# Patient Record
Sex: MALE | Race: WHITE | Employment: OTHER | URBAN - METROPOLITAN AREA
[De-identification: names, ages, dates, MRNs, and addresses within clinical notes are randomized per-mention and may not be internally consistent; named-entity substitution may affect disease eponyms.]

---

## 2019-02-07 ENCOUNTER — OFFICE VISIT (OUTPATIENT)
Dept: ORTHOPEDIC SURGERY | Age: 64
End: 2019-02-07
Payer: COMMERCIAL

## 2019-02-07 VITALS
HEART RATE: 60 BPM | DIASTOLIC BLOOD PRESSURE: 75 MMHG | WEIGHT: 139 LBS | HEIGHT: 70 IN | SYSTOLIC BLOOD PRESSURE: 113 MMHG | BODY MASS INDEX: 19.9 KG/M2

## 2019-02-07 DIAGNOSIS — M23.204 DEGENERATIVE TEAR OF LEFT MEDIAL MENISCUS: ICD-10-CM

## 2019-02-07 DIAGNOSIS — M25.562 LEFT KNEE PAIN, UNSPECIFIED CHRONICITY: Primary | ICD-10-CM

## 2019-02-07 PROCEDURE — 99203 OFFICE O/P NEW LOW 30 MIN: CPT | Performed by: ORTHOPAEDIC SURGERY

## 2019-02-07 RX ORDER — DORZOLAMIDE HCL 20 MG/ML
2 SOLUTION/ DROPS OPHTHALMIC 3 TIMES DAILY
COMMUNITY

## 2019-02-07 RX ORDER — PREDNISONE 1 MG/1
1 TABLET ORAL DAILY
COMMUNITY
End: 2020-07-21

## 2019-02-07 RX ORDER — LATANOPROST 50 UG/ML
1 SOLUTION/ DROPS OPHTHALMIC NIGHTLY
COMMUNITY

## 2019-03-14 ENCOUNTER — OFFICE VISIT (OUTPATIENT)
Dept: ORTHOPEDIC SURGERY | Age: 64
End: 2019-03-14
Payer: COMMERCIAL

## 2019-03-14 VITALS
RESPIRATION RATE: 12 BRPM | HEART RATE: 48 BPM | HEIGHT: 70 IN | DIASTOLIC BLOOD PRESSURE: 77 MMHG | BODY MASS INDEX: 19.9 KG/M2 | SYSTOLIC BLOOD PRESSURE: 125 MMHG | WEIGHT: 139 LBS

## 2019-03-14 DIAGNOSIS — M25.521 RIGHT ELBOW PAIN: Primary | ICD-10-CM

## 2019-03-14 DIAGNOSIS — M77.11 LATERAL EPICONDYLITIS OF RIGHT ELBOW: ICD-10-CM

## 2019-03-14 PROCEDURE — 99214 OFFICE O/P EST MOD 30 MIN: CPT | Performed by: ORTHOPAEDIC SURGERY

## 2019-03-14 PROCEDURE — L3908 WHO COCK-UP NONMOLDE PRE OTS: HCPCS | Performed by: ORTHOPAEDIC SURGERY

## 2019-03-14 ASSESSMENT — ENCOUNTER SYMPTOMS
RESPIRATORY NEGATIVE: 1
GASTROINTESTINAL NEGATIVE: 1

## 2019-03-15 ENCOUNTER — TELEPHONE (OUTPATIENT)
Dept: ORTHOPEDIC SURGERY | Age: 64
End: 2019-03-15

## 2019-03-28 ENCOUNTER — TELEPHONE (OUTPATIENT)
Dept: ORTHOPEDIC SURGERY | Age: 64
End: 2019-03-28

## 2019-11-27 ENCOUNTER — OFFICE VISIT (OUTPATIENT)
Dept: INTERNAL MEDICINE CLINIC | Age: 64
End: 2019-11-27
Payer: COMMERCIAL

## 2019-11-27 VITALS
HEIGHT: 70 IN | WEIGHT: 132 LBS | DIASTOLIC BLOOD PRESSURE: 66 MMHG | BODY MASS INDEX: 18.9 KG/M2 | SYSTOLIC BLOOD PRESSURE: 122 MMHG

## 2019-11-27 DIAGNOSIS — M35.3 POLYMYALGIA RHEUMATICA (HCC): ICD-10-CM

## 2019-11-27 DIAGNOSIS — H61.23 BILATERAL IMPACTED CERUMEN: ICD-10-CM

## 2019-11-27 DIAGNOSIS — H40.1190 PRIMARY OPEN ANGLE GLAUCOMA, UNSPECIFIED GLAUCOMA STAGE, UNSPECIFIED LATERALITY: ICD-10-CM

## 2019-11-27 DIAGNOSIS — Z00.00 PHYSICAL EXAM: Primary | ICD-10-CM

## 2019-11-27 PROCEDURE — 99396 PREV VISIT EST AGE 40-64: CPT | Performed by: INTERNAL MEDICINE

## 2019-11-27 ASSESSMENT — PATIENT HEALTH QUESTIONNAIRE - PHQ9
2. FEELING DOWN, DEPRESSED OR HOPELESS: 0
1. LITTLE INTEREST OR PLEASURE IN DOING THINGS: 0
SUM OF ALL RESPONSES TO PHQ9 QUESTIONS 1 & 2: 0
SUM OF ALL RESPONSES TO PHQ QUESTIONS 1-9: 0
SUM OF ALL RESPONSES TO PHQ QUESTIONS 1-9: 0

## 2019-12-05 DIAGNOSIS — Z00.00 PHYSICAL EXAM: ICD-10-CM

## 2019-12-05 DIAGNOSIS — M35.3 POLYMYALGIA RHEUMATICA (HCC): ICD-10-CM

## 2019-12-05 LAB
A/G RATIO: 2.8 (ref 1.1–2.2)
ALBUMIN SERPL-MCNC: 4.4 G/DL (ref 3.4–5)
ALP BLD-CCNC: 49 U/L (ref 40–129)
ALT SERPL-CCNC: 16 U/L (ref 10–40)
ANION GAP SERPL CALCULATED.3IONS-SCNC: 10 MMOL/L (ref 3–16)
AST SERPL-CCNC: 20 U/L (ref 15–37)
BASOPHILS ABSOLUTE: 0.1 K/UL (ref 0–0.2)
BASOPHILS RELATIVE PERCENT: 1.1 %
BILIRUB SERPL-MCNC: 0.9 MG/DL (ref 0–1)
BILIRUBIN URINE: NEGATIVE
BLOOD, URINE: NEGATIVE
BUN BLDV-MCNC: 18 MG/DL (ref 7–20)
CALCIUM SERPL-MCNC: 8.9 MG/DL (ref 8.3–10.6)
CHLORIDE BLD-SCNC: 103 MMOL/L (ref 99–110)
CHOLESTEROL, TOTAL: 137 MG/DL (ref 0–199)
CLARITY: CLEAR
CO2: 25 MMOL/L (ref 21–32)
COLOR: YELLOW
CREAT SERPL-MCNC: 0.8 MG/DL (ref 0.8–1.3)
EOSINOPHILS ABSOLUTE: 0.1 K/UL (ref 0–0.6)
EOSINOPHILS RELATIVE PERCENT: 1.3 %
GFR AFRICAN AMERICAN: >60
GFR NON-AFRICAN AMERICAN: >60
GLOBULIN: 1.6 G/DL
GLUCOSE BLD-MCNC: 96 MG/DL (ref 70–99)
GLUCOSE URINE: NEGATIVE MG/DL
HCT VFR BLD CALC: 45 % (ref 40.5–52.5)
HDLC SERPL-MCNC: 43 MG/DL (ref 40–60)
HEMOGLOBIN: 15.3 G/DL (ref 13.5–17.5)
KETONES, URINE: NEGATIVE MG/DL
LDL CHOLESTEROL CALCULATED: 82 MG/DL
LEUKOCYTE ESTERASE, URINE: NEGATIVE
LYMPHOCYTES ABSOLUTE: 1.5 K/UL (ref 1–5.1)
LYMPHOCYTES RELATIVE PERCENT: 31.3 %
MCH RBC QN AUTO: 32.3 PG (ref 26–34)
MCHC RBC AUTO-ENTMCNC: 34 G/DL (ref 31–36)
MCV RBC AUTO: 95.1 FL (ref 80–100)
MICROSCOPIC EXAMINATION: NORMAL
MONOCYTES ABSOLUTE: 0.4 K/UL (ref 0–1.3)
MONOCYTES RELATIVE PERCENT: 9.2 %
NEUTROPHILS ABSOLUTE: 2.8 K/UL (ref 1.7–7.7)
NEUTROPHILS RELATIVE PERCENT: 57.1 %
NITRITE, URINE: NEGATIVE
PDW BLD-RTO: 13.6 % (ref 12.4–15.4)
PH UA: 6.5 (ref 5–8)
PLATELET # BLD: 198 K/UL (ref 135–450)
PMV BLD AUTO: 9.6 FL (ref 5–10.5)
POTASSIUM SERPL-SCNC: 4.3 MMOL/L (ref 3.5–5.1)
PROSTATE SPECIFIC ANTIGEN: 1.12 NG/ML (ref 0–4)
PROTEIN UA: NEGATIVE MG/DL
RBC # BLD: 4.73 M/UL (ref 4.2–5.9)
SEDIMENTATION RATE, ERYTHROCYTE: 2 MM/HR (ref 0–20)
SODIUM BLD-SCNC: 138 MMOL/L (ref 136–145)
SPECIFIC GRAVITY UA: 1.01 (ref 1–1.03)
TOTAL PROTEIN: 6 G/DL (ref 6.4–8.2)
TRIGL SERPL-MCNC: 62 MG/DL (ref 0–150)
TSH SERPL DL<=0.05 MIU/L-ACNC: 2.52 UIU/ML (ref 0.27–4.2)
URINE TYPE: NORMAL
UROBILINOGEN, URINE: 0.2 E.U./DL
VLDLC SERPL CALC-MCNC: 12 MG/DL
WBC # BLD: 4.9 K/UL (ref 4–11)

## 2019-12-09 ENCOUNTER — HOSPITAL ENCOUNTER (OUTPATIENT)
Age: 64
Setting detail: OUTPATIENT SURGERY
Discharge: HOME | End: 2019-12-09
Attending: INTERNAL MEDICINE | Admitting: INTERNAL MEDICINE
Payer: COMMERCIAL

## 2019-12-09 VITALS
TEMPERATURE: 97.8 F | RESPIRATION RATE: 18 BRPM | OXYGEN SATURATION: 100 % | HEART RATE: 51 BPM | WEIGHT: 140 LBS | BODY MASS INDEX: 19.6 KG/M2 | HEIGHT: 71 IN | SYSTOLIC BLOOD PRESSURE: 121 MMHG | DIASTOLIC BLOOD PRESSURE: 72 MMHG

## 2019-12-09 PROCEDURE — 3609010600 HC COLONOSCOPY POLYPECTOMY SNARE/COLD BIOPSY: Performed by: INTERNAL MEDICINE

## 2019-12-09 PROCEDURE — 99153 MOD SED SAME PHYS/QHP EA: CPT | Performed by: INTERNAL MEDICINE

## 2019-12-09 PROCEDURE — 7100000010 HC PHASE II RECOVERY - FIRST 15 MIN: Performed by: INTERNAL MEDICINE

## 2019-12-09 PROCEDURE — 99152 MOD SED SAME PHYS/QHP 5/>YRS: CPT | Performed by: INTERNAL MEDICINE

## 2019-12-09 PROCEDURE — 2709999900 HC NON-CHARGEABLE SUPPLY: Performed by: INTERNAL MEDICINE

## 2019-12-09 PROCEDURE — 6360000002 HC RX W HCPCS: Performed by: INTERNAL MEDICINE

## 2019-12-09 PROCEDURE — 88305 TISSUE EXAM BY PATHOLOGIST: CPT

## 2019-12-09 PROCEDURE — 2580000003 HC RX 258: Performed by: INTERNAL MEDICINE

## 2019-12-09 PROCEDURE — 7100000011 HC PHASE II RECOVERY - ADDTL 15 MIN: Performed by: INTERNAL MEDICINE

## 2019-12-09 RX ORDER — SODIUM CHLORIDE 9 MG/ML
INJECTION, SOLUTION INTRAVENOUS CONTINUOUS
Status: DISCONTINUED | OUTPATIENT
Start: 2019-12-09 | End: 2019-12-09 | Stop reason: HOSPADM

## 2019-12-09 RX ORDER — UBIDECARENONE 200 MG
200 CAPSULE ORAL DAILY
COMMUNITY
End: 2021-07-08

## 2019-12-09 RX ORDER — FENTANYL CITRATE 50 UG/ML
INJECTION, SOLUTION INTRAMUSCULAR; INTRAVENOUS PRN
Status: DISCONTINUED | OUTPATIENT
Start: 2019-12-09 | End: 2019-12-09 | Stop reason: ALTCHOICE

## 2019-12-09 RX ORDER — MIDAZOLAM HYDROCHLORIDE 1 MG/ML
INJECTION INTRAMUSCULAR; INTRAVENOUS PRN
Status: DISCONTINUED | OUTPATIENT
Start: 2019-12-09 | End: 2019-12-09 | Stop reason: ALTCHOICE

## 2019-12-09 RX ORDER — ASCORBIC ACID 500 MG
500 TABLET ORAL DAILY
COMMUNITY
End: 2021-07-08

## 2019-12-09 RX ADMIN — SODIUM CHLORIDE: 9 INJECTION, SOLUTION INTRAVENOUS at 06:33

## 2019-12-09 ASSESSMENT — PAIN SCALES - GENERAL
PAINLEVEL_OUTOF10: 0

## 2019-12-09 ASSESSMENT — PAIN - FUNCTIONAL ASSESSMENT: PAIN_FUNCTIONAL_ASSESSMENT: 0-10

## 2020-03-19 ENCOUNTER — TELEPHONE (OUTPATIENT)
Dept: DERMATOLOGY | Age: 65
End: 2020-03-19

## 2020-03-23 NOTE — TELEPHONE ENCOUNTER
LVM in regards to cancelled appt   Advised patient he will be placed on a list for rescheduling when we are able to resume office   Asked patient to call back and let us know he received message

## 2020-06-08 ENCOUNTER — TELEPHONE (OUTPATIENT)
Dept: DERMATOLOGY | Age: 65
End: 2020-06-08

## 2020-06-25 NOTE — TELEPHONE ENCOUNTER
Dr. Kary Toth patient    Patient calling back to get scheduled. He has a lesion on his back. He was a former patient of Dr Nunes's. Thanks!     Call back # [phone]

## 2020-07-21 ENCOUNTER — OFFICE VISIT (OUTPATIENT)
Dept: DERMATOLOGY | Age: 65
End: 2020-07-21
Payer: COMMERCIAL

## 2020-07-21 VITALS — TEMPERATURE: 97.9 F

## 2020-07-21 PROCEDURE — 99243 OFF/OP CNSLTJ NEW/EST LOW 30: CPT | Performed by: DERMATOLOGY

## 2020-07-21 PROCEDURE — 11102 TANGNTL BX SKIN SINGLE LES: CPT | Performed by: DERMATOLOGY

## 2020-07-21 NOTE — PATIENT INSTRUCTIONS
Wound care instructions    1. Clean with soap and water twice daily  2. Apply Vaseline or Aquaphor ointment twice daily  3. Cover with small dressing or Band-Aid for 7-10 days. Studies show that wounds heal better when covered with ointment and a bandage. FREQUENTLY ASKED QUESTIONS:    Awais Tovar It is OK to get the wound wet when washing or bathing.  If bleeding should occur, apply firm direct pressure for 20 minutes CONTINUOUSLY. After 20 minutes, you may check to see if bleeding has stopped. If bleeding persists, please repeat CONTINUOUS PRESSURE for another 20 minutes WITHOUT LOOKING. o If bleeding still persists, call the office at [phone]   Signs of infection include increased redness extending over 1 centimeter from the wound, increased warmth, yellow to green purulent (pus-like) discharge, and significantly increased tenderness to the touch. If you have any concerns regarding infection or develop fevers or chills, please call [phone]   You will be contacted with the results of your procedure when we receive them, usually within two weeks. Please call if you have not heard from us.

## 2020-07-23 LAB — DERMATOLOGY PATHOLOGY REPORT: ABNORMAL

## 2020-07-24 ENCOUNTER — TELEPHONE (OUTPATIENT)
Dept: DERMATOLOGY | Age: 65
End: 2020-07-24

## 2020-07-27 NOTE — PROGRESS NOTES
Patient's Name: Umm Toth  MRN: [id number]  YOB: 1955  Date of Visit: 7/21/2020  Primary Care Provider: Juan Lundberg MD  Referring Provider: Juan Lundberg MD    Subjective:     Chief Complaint   Patient presents with    New Patient     growth on Right leg x 1 year, increased in size over the last 6 months        History of Present Illness:  Umm Toth is a 59 y.o. male who presents in clinic today as a new patient seen in consultation request by Juan Lundberg MD for a skin examination and evaluation of a lesion on his leg . They have not undergone a full body skin examination in the past.     What is it: changing lesion  Signs and symptoms: increasing diameter and increasing thickness  Location: Rt leg  Severity: Severity now is 3-4/10. Modifying factors: Things that make this condition worse are none. Things that make this condition better are none. Duration: This lesion has been present for 1 year. Current treatment: none  Previous treatment: none      What is it: changing lesion  Signs and symptoms: increasing diameter and darkening color  Location: Rt upper leg  Severity: Severity now is 5/10. Modifying factors: Things that make this condition worse are none. Things that make this condition better are none. Duration: This lesion has been present for 10 years.   Current treatment: none  Previous treatment: none      Past Dermatologic History:    negative personal history of skin cancer  negative personal history of melanoma  negative personal history of abnormal/dysplastic moles  negative personal history of tanning bed use  positive blistering sunburns    He reports  usually practicing sun protective habits using sunscreen SPF 30    Social History:      Occupation:   Marital status:    Smoking Status: never smoker  Children: 3  Hobbies: Running, biking, swimming and yard work    Past Medical History:  Past Medical History:   Diagnosis Date    PMR (polymyalgia rheumatica) (HCC)     Polymyalgia rheumatica (HCC) 11/27/2019    Spontaneous pneumothorax        Past Surgical History:  Past Surgical History:   Procedure Laterality Date    APPENDECTOMY      CHEST TUBE INSERTION      COLONOSCOPY N/A 12/9/2019    COLONOSCOPY POLYPECTOMY SNARE/COLD BIOPSY performed by Bowen Arora MD at [address] [place] [address]       Past Family History:  Family History   Problem Relation Age of Onset    Other Mother         Parkinsons Syndrome    Cancer Mother         SCC & BCC    Prostate Cancer Father     Cancer Father         SCC & BCC    Heart Disease Brother     Other Brother         Inflammatroy Bowel disease     Patient reports  a family history of cutaneous malignancy (parents with ? BCC/SCC)  Patient denies  a family history of abnormal moles  Patient denies  a family history of melanoma. Allergies:  No Known Allergies    Current Medications:  Current Outpatient Medications   Medication Sig Dispense Refill    Krill Oil 300 MG CAPS Take 300 mg by mouth daily      B Complex Vitamins (VITAMIN-B COMPLEX PO) Take 1 tablet by mouth daily      vitamin C (ASCORBIC ACID) 500 MG tablet Take 500 mg by mouth daily      Multiple Vitamins-Minerals (MULTIVITAMIN ADULT PO) Take 1 tablet by mouth daily      Hyaluronic Acid-Vitamin C (HYALURONIC ACID PO) Take 1 tablet by mouth daily      coenzyme Q10 200 MG CAPS capsule Take 200 mg by mouth daily      dorzolamide (TRUSOPT) 2 % ophthalmic solution 2 drops 3 times daily      latanoprost (XALATAN) 0.005 % ophthalmic solution 1 drop nightly       No current facility-administered medications for this visit. Review of Systems:  Constitutional: No fevers, chills or recent illness.  feels well   Skin: Skin:As per HPI AND otherwise no new, bleeding or symptomatic skin lesions      Objective:     Vitals:    07/21/20 1207   Temp: 97.9 °F (36.6 °C)   TempSrc: Oral       Physical Examination:  General: alert, hyperpigmentation. This was most prominent on the face, anterior chest, and shoulders. The patient was reassured that these changes do not require treatment, but indicated a significant amount of sun damage in the patient's past. The patient was briefly counseled on the importance of sun protective habits and encouraged to be seen for follow up evaluations in the future. ABCDEs of melanoma were reviewed. - The importance of continued surveillance was discussed. Monthly self examinations were encouraged. - Signs of cutaneous malignancy were discussed and they were encouraged to contact me if they note any evolving, bleeding, painful or non-healing lesions. ABCDEs of melanoma also reviewed. - Photoprotection was encouraged and written material on sunscreen provided. Follow up:  Return visit in 1-2 months or as needed for change in condition. All questions addressed. Procedure:   Procedure: TANGENTIAL BIOPSY (BIOPSY BY SHAVE TECHNIQUE) (35665)  Location:  Left anterior thigh  Indication: Neoplasm uncertain behavior of skin  Size: 5 mm     Risks, benefits, and alternatives were explained and verbal informed consent was obtained. The area was photographed with patients permission. The area was cleaned with alcohol and infiltrated with 1% lidocaine with epinephrine. The lesion was then biopsied by shave technique using a Dermablade. Hemostasis was achieved with aluminum chloride. Vaseline ointment and sterile dressing were applied and wound care instructions were given verbally and in writing. The patient tolerated the procedure well with no complications. The patient will be notified by mail or telephone of pathology results and is instructed to call us if they have not received notification within three weeks. I saw your patient Tammy Pereira at the date listed above in my Dermatology  clinic in [facility].  Thank you very much for the referral.    My exam findings, assessment and plan can be found in EPIC, I have also attached them below for your convenience. I very much appreciate your referral and the opportunity to participate in this patient's care. Please don't hesitate to contact me with questions or concerns about our visit or management of this patient in the future. Please feel free to call me anytime on my cell, at [phone]  and/or e-mail at [email or web address]  if I can be of any further assistance to you or to any of your patients.     Adele Benz MD, MS

## 2020-08-21 ENCOUNTER — PROCEDURE VISIT (OUTPATIENT)
Dept: DERMATOLOGY | Age: 65
End: 2020-08-21
Payer: COMMERCIAL

## 2020-08-21 VITALS — TEMPERATURE: 98.2 F

## 2020-08-21 PROCEDURE — 17262 DSTRJ MAL LES T/A/L 1.1-2.0: CPT | Performed by: DERMATOLOGY

## 2020-08-21 NOTE — PATIENT INSTRUCTIONS
Wound care instructions    1. Clean with soap and water twice daily  2. Apply Vaseline or Aquaphor ointment twice daily  3. Cover with small dressing or Band-Aid for 7-10 days. Studies show that wounds heal better when covered with ointment and a bandage. FREQUENTLY ASKED QUESTIONS:    Juan C Morgan It is OK to get the wound wet when washing or bathing.  If bleeding should occur, apply firm direct pressure for 20 minutes CONTINUOUSLY. After 20 minutes, you may check to see if bleeding has stopped. If bleeding persists, please repeat CONTINUOUS PRESSURE for another 20 minutes WITHOUT LOOKING. o If bleeding still persists, call the office at [phone]   Signs of infection include increased redness extending over 1 centimeter from the wound, increased warmth, yellow to green purulent (pus-like) discharge, and significantly increased tenderness to the touch. If you have any concerns regarding infection or develop fevers or chills, please call [phone]   You will be contacted with the results of your procedure when we receive them, usually within two weeks. Please call if you have not heard from us.

## 2020-08-22 NOTE — PROGRESS NOTES
Electrodesiccation Note    Patient's Name: Jovita Gee  MRN: [id number]  YOB: 1955  Date of Visit: 8/21/2020  Referring Provider: No ref. provider found    Subjective:   Jovita Gee comes to clinic, for treatment of skin cancer. The provider discussed the risks, benefits, and alternatives to [facility] including pain, bleeding, infection, scar, and damage to either sensory or motor nerves. These are inherent with any surgery, and everything possible will be done to minimize these risks with this procedure. The benefits include removal of the skin cancer with the fewest possible risks and the most cost effective treatment for this particular cancer and area. The alternatives include excision, Mohs, topical therapy, and observation all which have either worse outcomes or are not deemed medically appropriate or necessary for this type of cancer and location because they offer little therapeutic benefit and would cost more, or are less likely to offer an appropriate treatment of this cancer. 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  There is underlying   chronic inflammation. Ron Abraham MD   Electronic Signature: 23 JUL 2020 11:34 AM       PRE PROCEDURE TIME OUT:  Site confirmed using dermpath report, photographs and residual biopsy scar  with patient by:            Mason Ellison MD, MS        Procedure:    Electrodesiccation and Currettage  Location:  Left anterior thigh  Indication:   Bowen's disease (SCCIS)  Pretreatment Size:  6 mm  Post Treatment Size:  12 mm      Verbal informed consent was obtained. The area was not photographed. The area was cleaned with alcohol and infiltrated with 1% lidocaine with epinephrine.   After adequate analgesia had been confirmed, the area was treated with curettage followed by electrodesiccation. There were 3 passes. Hemostasis was achieved with aluminum chloride. Vaseline ointment and a sterile dressing were applied and written wound care instructions were given to the patient verbally and in writing. The patient tolerated the procedure well.           Andrea Dotson MD, MS

## 2021-01-29 ENCOUNTER — OFFICE VISIT (OUTPATIENT)
Dept: DERMATOLOGY | Age: 66
End: 2021-01-29
Payer: MEDICARE

## 2021-01-29 VITALS — TEMPERATURE: 96.9 F

## 2021-01-29 DIAGNOSIS — L57.0 ACTINIC KERATOSIS: Primary | ICD-10-CM

## 2021-01-29 DIAGNOSIS — D48.5 NEOPLASM OF UNCERTAIN BEHAVIOR OF SKIN: ICD-10-CM

## 2021-01-29 DIAGNOSIS — L64.9 ANDROGENETIC ALOPECIA: ICD-10-CM

## 2021-01-29 DIAGNOSIS — L73.8 SEBACEOUS HYPERPLASIA: ICD-10-CM

## 2021-01-29 DIAGNOSIS — D18.01 CHERRY ANGIOMA: ICD-10-CM

## 2021-01-29 DIAGNOSIS — H61.001 CHONDRODERMATITIS NODULARIS CHRONICA HELICIS, RIGHT: ICD-10-CM

## 2021-01-29 DIAGNOSIS — L82.1 SEBORRHEIC KERATOSIS: ICD-10-CM

## 2021-01-29 DIAGNOSIS — D22.9 MULTIPLE BENIGN MELANOCYTIC NEVI: ICD-10-CM

## 2021-01-29 DIAGNOSIS — L57.3 POIKILODERMA OF CIVATTE: ICD-10-CM

## 2021-01-29 PROCEDURE — 99214 OFFICE O/P EST MOD 30 MIN: CPT | Performed by: DERMATOLOGY

## 2021-01-29 PROCEDURE — 17000 DESTRUCT PREMALG LESION: CPT | Performed by: DERMATOLOGY

## 2021-01-29 NOTE — PATIENT INSTRUCTIONS
Cryosurgery (Liquid Nitrogen Treatment): Aftercare instructions    Cryosurgery is the use of a cold, liquefied gas to remove superficial skin growths such as warts and keratoses. This liquefied gas is cooled to around 300? below 0? F. When it comes in contact with a skin growth, the growth is frozen and should fall away or shrink. What to expect:  After treatment, your skin will become red and swollen and may develop a blister or scab. Healing takes 1 to 3 weeks, after which the skin may look perfectly normal or may be slightly lighter in color. Sometimes the growth will not disappear completely and multiple treatments may be needed. This is especially true for warts. Proper care after the procedure:    Clean the treated area with soap and water as you normally would. It is fine to get the area wet; however, be sure to blot it dry carefully and delicately.  If necessary, you can open the blister with a sterilized needle.  If a scab or small wound forms, apply a thin layer of Vaseline/Aquaphor ointment to the site twice daily.  If the site is prone to irritation, cover it with a bandage.  You will likely see a crust form after a few days. This crust will fall off on its own. Dr. Eber Pearcelie Rucker Safe Strategies:    1. Avoid the sun if possible especially between the hours of 10 am and 4 pm. That's when the sun's most harmful UV rays are hitting the earth. 2. Use protective clothing. Just like the right equipment helps you perform better in your sport, the proper clothing can do a lot to help us in our fight to prevent skin cancer. Thicker and darker clothing with a tighter weave will block more of the suns harmful rays, and it never wears off! Make wearing long sleeves, a broad brimmed hat and sun glasses part of your routine. 3. Use a broad spectrum sunscreen with SPF 30 or higher on all your exposed skin.  Make sure the sunscreen has either titanium dioxide, and/or zinc oxide (preferred), or Avobenzone in it (check the active ingredients on the back of the bottle to make sure). If you are going to be in the water or sweating, make sure the sunscreen you choose is water resistant. Better sunscreens are available in [place] ([facility]) and [facility] like Tinosorb S (Bemotrizinol) and Tinosorb M ([address] [place] [address]), so if you are serious about sun protection and visit those areas, feel free to stock up. 4. Reapply the sunscreen after 2 hours or after swimming, sweating, or toweling off. We don't care about the brand, but some good brands to use are Reba Salamanca MD , [facility], and really just about anything with the above ingredients. Cetaphil oil control moisturizer with SPF 30 or 50 is a good one for the face. It is not water resistant but goes on really light and won't make you break out. 5. Use enough. One ounce of lotion, which is about the size of a golf ball, is the amount needed to cover the exposed parts of an average adult body. For sprays, apply until it \"glistens\" on skin (2 seconds of spray per arm, 4 seconds per leg, and 5 to 8 seconds each for the front torso and back). For sticks apply 3 to 4 passes back and forth per area. 6. Get regular check ups. Studies show that those who check their own skin every month, get checked by their spouse, partner, or significant other several times a year (we recommend you do this on your birthdays, anniversary, and big holidays), and get their yearly check by a board certified dermatologist have their skin cancers detected much earlier and have a much higher chance of cure than those who don't. A-B-C-D-E guide for Melanoma     Characteristics of unusual moles that may indicate melanomas or other skin cancers follow the A-B-C-D-E guide developed by the [employer]:  1. A is for asymmetrical shape. Look for moles with irregular shapes, such as two very different-looking halves. 2. B is for irregular border.  Look for moles with

## 2021-01-29 NOTE — PROGRESS NOTES
Patient's Name: Roseanne Patel  MRN: [id number]  YOB: 1955  Date of Visit: 1/29/2021  Primary Care Provider: Tori Madrid MD  Referring Provider: No ref. provider found    Subjective:     Chief Complaint   Patient presents with    Follow-up     Skin exam and lesion on left upper thigh. History of Present Illness:  Roseanne Patel is a 72 y.o. male with a h/o SCCIS is a  follow up patient to my practice. They were last seen in clinic on 8/21/20     Patient reports a personal history of skin cancer. Bowen's disease of left anterior thigh s/p ED&C on 8/22/20  Patient denies a personal history of melanoma. Patient reports a family history of skin cancer. Patient reports a history of blistering sunburns. Patient denies a history of tanning bed use. Patient currently is compliant with using sun-protective measures. He has no concerning lesions today. He is concerned about the following spot/s that he would like checked:     Signs and symptoms: scaly and rough  Location: nose. Modifying factors: Things that make this condition worse are none. Things that make this condition better are none. Duration: This condition has gone on for several years   Current treatment: none  Previous treatment: cryotherapy x 1    He is also concerned about hair loss and would like to see if there are treatments for it. He reports his Rt ear getting red, crusty and inflamed several months ago. Currently improved. Denies any other changing, bleeding, painful, or itchy lesions.     Past medical/surgical/family history reviewed with no changes since last visit on 7/21/20    Allergies:  No Known Allergies    Current Medications:  Current Outpatient Medications   Medication Sig Dispense Refill    Krill Oil 300 MG CAPS Take 300 mg by mouth daily      B Complex Vitamins (VITAMIN-B COMPLEX PO) Take 1 tablet by mouth daily      vitamin C (ASCORBIC ACID) 500 MG tablet Take 500 mg by mouth daily  Multiple Vitamins-Minerals (MULTIVITAMIN ADULT PO) Take 1 tablet by mouth daily      Hyaluronic Acid-Vitamin C (HYALURONIC ACID PO) Take 1 tablet by mouth daily      coenzyme Q10 200 MG CAPS capsule Take 200 mg by mouth daily      dorzolamide (TRUSOPT) 2 % ophthalmic solution 2 drops 3 times daily      latanoprost (XALATAN) 0.005 % ophthalmic solution 1 drop nightly       No current facility-administered medications for this visit. Review of Systems:  Constitutional: No fevers, chills or recent illness. Skin: Skin:As per HPI AND otherwise no new, bleeding or symptomatic skin lesions      Objective:     Vitals:    01/29/21 0936   Temp: 96.9 °F (36.1 °C)       Physical Examination:  General: alert, comfortable, no apparent distress, well-appearing  Psych: alert, oriented and pleasant  Neuro: oriented to person, place, and time  Skin: Areas examined: head including face, lips, conjunctiva and lids, neck, hair/scalp, chest, including breasts and axilla, abdomen, back, buttocks, right upper extremity, left upper extremity, right lower extremity, left lower extremity, left hand, right hand, digits and nails, Right foot, Left foot and toe nails      All areas examined were within normal limits except those listed below with the appropriate assessment and plan    Assessment and Plan (with relevant objective exam findings):     1. Actinic Keratosis    Objective findings: erythematous, gritty, scaly keratotic papule with slight hyperkeratosis located on:  nasal dorsum    The patient was told that actinic keratosis(es) represent precancers of the skin that have a small chance of developing into squamous cell carcinoma.     Discussed options for therapy and risks/benefits and alternatives with patient including Liquid nitrogen, photodynamic therapy, topical field therapy (fluorouracil, imiquimod, other), etc     Decision was made to do the following:  -LN2 to 1 lesion.  -Edu re: risk of blister formation, discomfort, scar, hypopigmentation. See procedure note below  -Edu re: relationship with chronic cumulative sun exposure, low premalignant potential.   Discussed wound care. -Reviewed sun protective behavior -- sun avoidance during the peak hours of the day, sun-protective clothing (including hat and sunglasses), sunscreen use (water resistant, broad spectrum, SPF at least 30, need for reapplication every 2 to 3 hours). 2.Seborrheic Keratosis  Location: right temple,right cheek, chest, right breast, back  Objective: Waxy, stuck on keratotic brown macules and papules. - Discussed the benign nature of these lesions and that there is no malignant potential.  Treatment is destructive or removal which would be considered cosmetic and not covered by insurance. Treatment today: reassurance. 3.Sebaceous hyperplasia. Location: Face  Objective findings: yellow umbilicated papule(s) with blood vessels overlying them on dermatoscopy. We discussed that this is a benign growth consisting of clogged and swollen oil glands most likely due to a combination of genetic factors and sun damage. Treatment is not necessary but can be done with destruction (curettage and electrodesiccation on low voltage) if desired for a fee    Decision was made to do the following:  Observation/no treatment    4. Cherry Hemangiomas  Location: scalp, arms, chest, back  Objective findings: Multiple bright red, dome-shaped papules     Discussed that these are benign lesions and require no treatment. Removal is usually not done unless they bleed or are irritated, in which case it may be medically necessary and can be done with electrodesiccation, shave removal, or laser therapy, but this may leave a scar that is not preferable to the lesion itself. Patient elected no treatment      5.  Chondrodermatitis nodularis helicis  Location/Objective findings: right antihelix with erythema and telangiectasia    -Edu re: benignity, relationship w/ chronic cumulative pressure on protuberant area(s) of ear. -Explained that symptoms associated w/ CNH sometimes resolve with using [facility] ear protector. However, if pt continues have experience symptoms, treatment options include [place] kenalog, cryotherapy, nitroglycerine, donut pillow. 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 Kanika Ayon protection is key to prevent further damage. The skin changes are benign and can be treated cosmetically with either IPL or Pulsed Dye Laser    7. Multiple benign melanocytic nevi   Location: lower legs, right posterior neck, right buttock   Objective findings: multiple brown/pink macules and papules without abnormal findings or concerning findings on exam and dermatoscopy    -Counseled the patient that these are benign and need no therapy. Observation for any changes recommended     8. Neoplasm of uncertain behavior, skin  Location: left lateral thigh    Objective findings: ~7 mm reddish brown papulonodule(s) that dimple with lateral pressure. Ddx: dermatofibroma, inflamed nevus, scar, less likely DFSP    Growths such as this are most likely benign, but can be removed surgically when the diagnosis is in question, or they are irritated, growing or causing pain. Removal of lesions will produce a scar that may be more noticeable than the original spot. Excision/Removal recommended due to irritation and symptoms including:pain and pruritus  Will schedule for excision in the next 2 months    9. Androgenetic alopecia. Reviewed natural history of diagnosis including tendency to worsen over time without treatment.  Discussed treatment options including topical vs oral minoxidil, finasteride and red light therapy. After discussion of potential treatments the following were decided:   -Rogaine foam, apply morning and night to areas of hair loss. Can cause excessive facial hair growth if it comes into contact with face. I spent a total of at least 40 minutes     1. Preparing to see the patient ( reviewing records and tests)  2. Performing a medically appropriate examination and evaluation  3. Counseling and educating the patient  4. Ordering medications, tests, or procedures  5. Referring and communicating with other healthcare professionals  6. Documenting clinical information in the electronic or other health record  7. Risk factors for conditions such as nevi, skin cancers, photo-aging and skin damage including genetics and UV radiation from the sun was discussed. Recommended sun avoidance, use of protective clothing and broad spectrum sunscreen containing avobenzone, titanium dioxide, or zinc oxide with SPF 50 or higher and yearly full skin exams with a dermatologist.  Encouraged self skin checks each month, spouse/partner skin checks on birthdays, big holidays and anniversaries, and return to clinic sooner than a year if any new, changing, or concerning spots are identified. Follow up:  Return visit in 1 year or as needed for change in condition. All questions addressed. Procedure:     Procedure: Cryotherapy  Location(s):  Nasal dorsum  Indication:  premalignant  Total Number of Lesions:  1    Risks, benefits and alternatives were explained and verbal informed consent was obtained. The area was not photographed. The lesion was treated with cryotherapy, using liquid nitrogen, for 2 freeze cycles of approximately 10 seconds each. Skin was allowed to thaw completely before each cycle. The patient was advised to have wound examined if problems with healing occur, or evidence of infection develops.      Wound care instructions were reviewed with the patient and He was advised that if the site is not completely resolved at follow-up, re-treatment with this or other method should be considered. The patient tolerated the procedure well without complications.             Robert Oorzco MD. MS

## 2021-02-17 ENCOUNTER — IMMUNIZATION (OUTPATIENT)
Dept: PRIMARY CARE CLINIC | Age: 66
End: 2021-02-17
Payer: MEDICARE

## 2021-02-17 PROCEDURE — 0011A COVID-19, MODERNA VACCINE 100MCG/0.5ML DOSE: CPT | Performed by: FAMILY MEDICINE

## 2021-02-17 PROCEDURE — 91301 COVID-19, MODERNA VACCINE 100MCG/0.5ML DOSE: CPT | Performed by: FAMILY MEDICINE

## 2021-03-17 ENCOUNTER — IMMUNIZATION (OUTPATIENT)
Dept: PRIMARY CARE CLINIC | Age: 66
End: 2021-03-17
Payer: MEDICARE

## 2021-03-17 PROCEDURE — 0012A COVID-19, MODERNA VACCINE 100MCG/0.5ML DOSE: CPT | Performed by: FAMILY MEDICINE

## 2021-03-17 PROCEDURE — 91301 COVID-19, MODERNA VACCINE 100MCG/0.5ML DOSE: CPT | Performed by: FAMILY MEDICINE

## 2021-04-28 ENCOUNTER — OFFICE VISIT (OUTPATIENT)
Dept: INTERNAL MEDICINE CLINIC | Age: 66
End: 2021-04-28
Payer: MEDICARE

## 2021-04-28 VITALS
HEIGHT: 70 IN | DIASTOLIC BLOOD PRESSURE: 80 MMHG | WEIGHT: 138 LBS | BODY MASS INDEX: 19.76 KG/M2 | SYSTOLIC BLOOD PRESSURE: 120 MMHG

## 2021-04-28 DIAGNOSIS — E55.9 VITAMIN D DEFICIENCY: ICD-10-CM

## 2021-04-28 DIAGNOSIS — Z23 NEED FOR PNEUMOCOCCAL VACCINE: ICD-10-CM

## 2021-04-28 DIAGNOSIS — Z00.00 PE (PHYSICAL EXAM), ANNUAL: Primary | ICD-10-CM

## 2021-04-28 DIAGNOSIS — Z12.5 SCREENING PSA (PROSTATE SPECIFIC ANTIGEN): ICD-10-CM

## 2021-04-28 DIAGNOSIS — M35.3 POLYMYALGIA RHEUMATICA (HCC): ICD-10-CM

## 2021-04-28 DIAGNOSIS — E78.00 PURE HYPERCHOLESTEROLEMIA: ICD-10-CM

## 2021-04-28 DIAGNOSIS — H40.1190 PRIMARY OPEN ANGLE GLAUCOMA, UNSPECIFIED GLAUCOMA STAGE, UNSPECIFIED LATERALITY: ICD-10-CM

## 2021-04-28 PROCEDURE — G0009 ADMIN PNEUMOCOCCAL VACCINE: HCPCS | Performed by: INTERNAL MEDICINE

## 2021-04-28 PROCEDURE — G0402 INITIAL PREVENTIVE EXAM: HCPCS | Performed by: INTERNAL MEDICINE

## 2021-04-28 PROCEDURE — 90732 PPSV23 VACC 2 YRS+ SUBQ/IM: CPT | Performed by: INTERNAL MEDICINE

## 2021-04-28 ASSESSMENT — LIFESTYLE VARIABLES
HAS A RELATIVE, FRIEND, DOCTOR, OR ANOTHER HEALTH PROFESSIONAL EXPRESSED CONCERN ABOUT YOUR DRINKING OR SUGGESTED YOU CUT DOWN: 0
AUDIT-C TOTAL SCORE: 4
HOW OFTEN DURING THE LAST YEAR HAVE YOU HAD A FEELING OF GUILT OR REMORSE AFTER DRINKING: 0
AUDIT TOTAL SCORE: 4
HOW OFTEN DURING THE LAST YEAR HAVE YOU FOUND THAT YOU WERE NOT ABLE TO STOP DRINKING ONCE YOU HAD STARTED: 0
HOW MANY STANDARD DRINKS CONTAINING ALCOHOL DO YOU HAVE ON A TYPICAL DAY: 0
HAVE YOU OR SOMEONE ELSE BEEN INJURED AS A RESULT OF YOUR DRINKING: 0

## 2021-04-28 ASSESSMENT — PATIENT HEALTH QUESTIONNAIRE - PHQ9
2. FEELING DOWN, DEPRESSED OR HOPELESS: 0
SUM OF ALL RESPONSES TO PHQ QUESTIONS 1-9: 1

## 2021-04-28 NOTE — PROGRESS NOTES
SCC & BCC    Heart Disease Brother     Other Brother         Inflammatroy Bowel disease       CareTeam (Including outside providers/suppliers regularly involved in providing care):   Patient Care Team:  Judah Greenfield MD as PCP - General (Internal Medicine)  Judah Greenfield MD as PCP - [facility] Empaneled Provider    Wt Readings from Last 3 Encounters:   04/28/21 138 lb (62.6 kg)   12/09/19 140 lb (63.5 kg)   11/27/19 132 lb (59.9 kg)     Vitals:    04/28/21 1055   BP: 120/80   Weight: 138 lb (62.6 kg)   Height: 5' 10\" (1.778 m)     Body mass index is 19.8 kg/m². Based upon direct observation of the patient, evaluation of cognition reveals recent and remote memory intact.     General Appearance: alert and oriented to person, place and time, well developed and well- nourished, in no acute distress  Skin: warm and dry, no rash or erythema  Head: normocephalic and atraumatic  Eyes: pupils equal, round, and reactive to light, extraocular eye movements intact, conjunctivae normal  ENT: tympanic membrane, external ear and ear canal normal bilaterally, nose without deformity, nasal mucosa and turbinates normal without polyps  Neck: supple and non-tender without mass, no thyromegaly or thyroid nodules, no cervical lymphadenopathy  Pulmonary/Chest: clear to auscultation bilaterally- no wheezes, rales or rhonchi, normal air movement, no respiratory distress  Cardiovascular: normal rate, regular rhythm, normal S1 and S2, no murmurs, rubs, clicks, or gallops, distal pulses intact, no carotid bruits  Abdomen: soft, non-tender, non-distended, normal bowel sounds, no masses or organomegaly  Extremities: no cyanosis, clubbing or edema  Musculoskeletal: normal range of motion, no joint swelling, deformity or tenderness  Neurologic: reflexes normal and symmetric, no cranial nerve deficit, gait, coordination and speech normal    Patient's complete Health Risk Assessment and screening values have been reviewed and are found in Flowsheets. The following problems were reviewed today and where indicated follow up appointments were made and/or referrals ordered. Positive Risk Factor Screenings with Interventions:          General Health and ACP:  General  In general, how would you say your health is?: Very Good  In the past 7 days, have you experienced any of the following?  New or Increased Pain, New or Increased Fatigue, Loneliness, Social Isolation, Stress or Anger?: None of These  Do you get the social and emotional support that you need?: (!) No  Do you have a Living Will?: (!) No  Advance Directives     Power of  Living Will ACP-Advance Directive ACP-Power of     Not on File Not on File Not on File Not on File      General Health Risk Interventions:  · Poor self-assessment of health status: patient declines any further evaluation/treatment for this issue     Hearing/Vision:  No exam data present  Hearing/Vision  Do you or your family notice any trouble with your hearing that hasn't been managed with hearing aids?: No  Do you have difficulty driving, watching TV, or doing any of your daily activities because of your eyesight?: (!) Yes  Have you had an eye exam within the past year?: Yes  Hearing/Vision Interventions:  · Hearing concerns:  patient declines any further evaluation/treatment for hearing issues      Personalized Preventive Plan   Current Health Maintenance Status  Immunization History   Administered Date(s) Administered    COVID-19, Moderna, PF, 100mcg/0.5mL 02/17/2021, 03/17/2021    Tdap (Boostrix, Adacel) 12/05/2013        Health Maintenance   Topic Date Due    Hepatitis C screen  Never done    HIV screen  Never done    Shingles Vaccine (1 of 2) Never done    Pneumococcal 65+ years Vaccine (1 of 1 - PPSV23) Never done   [employer] Visit (AWV)  Never done    Flu vaccine (Season Ended) 09/01/2021    DTaP/Tdap/Td vaccine (2 - Td) 12/05/2023    Lipid screen  12/05/2024    Colon cancer screen

## 2021-04-28 NOTE — PROGRESS NOTES
Annual Wellness Visit     Patient:  Jason Morin                                               : 1955  Age: 72 y.o. MRN: [id number]  Date : 2021      CHIEF COMPLAINT: Jason Morin is a 72 y.o. male who presents for : Physical exam    1. Polymyalgia rheumatica (HCC)  Has had slight exacerbation of his polymyalgia rheumatica after having a second Covid shot about a month ago but he continues to train for an ultra marathon bike ride about 300 miles a week  - CBC Auto Differential; Future  - Comprehensive Metabolic Panel; Future  - Lipid Panel; Future  - PSA screening; Future  - TSH without Reflex; Future  - Urinalysis; Future  - Vitamin D 25 Hydroxy; Future  - SEDIMENTATION RATE; Future  - C-REACTIVE PROTEIN; Future  - CK; Future    2. Primary open angle glaucoma, unspecified glaucoma stage, unspecified laterality  Problem is stable followed by ophthalmology    3. PE (physical exam), annual  He feels good except for myalgias he also notes some decreased hearing and is wondering if he has impacted cerumen denies any chest pain shortness of breath or any other problem  - CBC Auto Differential; Future  - Comprehensive Metabolic Panel; Future  - Lipid Panel; Future  - PSA screening; Future  - TSH without Reflex; Future  - Urinalysis; Future  - Vitamin D 25 Hydroxy; Future  - SEDIMENTATION RATE; Future  - C-REACTIVE PROTEIN; Future  - CK; Future    4. Vitamin D deficiency    - Vitamin D 25 Hydroxy; Future    5. Pure hypercholesterolemia    - CBC Auto Differential; Future  - Comprehensive Metabolic Panel; Future  - Lipid Panel; Future  - PSA screening; Future  - TSH without Reflex; Future  - Urinalysis; Future  - Vitamin D 25 Hydroxy; Future  - SEDIMENTATION RATE; Future  - C-REACTIVE PROTEIN; Future  - CK; Future    6. Screening PSA (prostate specific antigen)    - PSA screening;  Future        Patient Active Problem List    Diagnosis Date Noted    Primary open angle glaucoma (POAG) 2019    Polymyalgia Lifestyle    Physical activity     Days per week: None     Minutes per session: None    Stress: None   Relationships    Social connections     Talks on phone: None     Gets together: None     Attends Hindu service: None     Active member of club or organization: None     Attends meetings of clubs or organizations: None     Relationship status: None    Intimate partner violence     Fear of current or ex partner: None     Emotionally abused: None     Physically abused: None     Forced sexual activity: None   Other Topics Concern    None   Social History Narrative    None         Allergies:  Patient has no known allergies. Current Medications:    Prior to Admission medications    Medication Sig Start Date End Date Taking? Authorizing Provider   Krill Oil 300 MG CAPS Take 300 mg by mouth daily   Yes Historical Provider, MD   B Complex Vitamins (VITAMIN-B COMPLEX PO) Take 1 tablet by mouth daily   Yes Historical Provider, MD   vitamin C (ASCORBIC ACID) 500 MG tablet Take 500 mg by mouth daily   Yes Historical Provider, MD   Multiple Vitamins-Minerals (MULTIVITAMIN ADULT PO) Take 1 tablet by mouth daily   Yes Historical Provider, MD   Hyaluronic Acid-Vitamin C (HYALURONIC ACID PO) Take 1 tablet by mouth daily   Yes Historical Provider, MD   coenzyme Q10 200 MG CAPS capsule Take 200 mg by mouth daily   Yes Historical Provider, MD   dorzolamide (TRUSOPT) 2 % ophthalmic solution 2 drops 3 times daily   Yes Historical Provider, MD   latanoprost (XALATAN) 0.005 % ophthalmic solution 1 drop nightly   Yes Historical Provider, MD           Physical Exam:      Constitutional:  Well developed, well nourished, no acute distress, non-toxic appearance   Eyes:  PERRL, conjunctiva normal   HENT:  Atraumatic, external ears normal, nose normal, oropharynx moist, no pharyngeal exudates.  Neck- normal range of motion, no tenderness, supple packed with cerumen left ear  Respiratory:  No respiratory distress, normal breath sounds, no rales, no wheezing   Cardiovascular:  Normal rate, normal rhythm, no murmurs, no gallops, no rubs   GI:  Soft, nondistended, normal bowel sounds, nontender, no organomegaly, no mass, no rebound, no guarding   :  No costovertebral angle tenderness   Musculoskeletal:  No edema, no tenderness, no deformities. Back- no tenderness  Integument:  Well hydrated, no rash   Lymphatic:  No lymphadenopathy noted   Neurologic:  Alert & oriented x 3, CN 2-12 normal, normal motor function, normal sensory function, no focal deficits noted   Psychiatric:  Speech and behavior appropriate   Rectal exam reveals slightly large prostate heme-negative stool    Vitals: /80   Ht 5' 10\" (1.778 m)   Wt 138 lb (62.6 kg)   BMI 19.80 kg/m²     Body mass index is 19.8 kg/m². Wt Readings from Last 3 Encounters:   04/28/21 138 lb (62.6 kg)   12/09/19 140 lb (63.5 kg)   11/27/19 132 lb (59.9 kg)         LABS:    CBC:   Lab Results   Component Value Date    WBC 4.9 12/05/2019    HGB 15.3 12/05/2019    HCT 45.0 12/05/2019    MCV 95.1 12/05/2019     12/05/2019         No results found for: IRON, TIBC, FERRITIN, FOLATE, [id number], PTH                                                          BMP:    Lab Results   Component Value Date     12/05/2019    K 4.3 12/05/2019     12/05/2019    CO2 25 12/05/2019       LFT's:   Lab Results   Component Value Date    ALT 16 12/05/2019    AST 20 12/05/2019    ALKPHOS 49 12/05/2019    BILITOT 0.9 12/05/2019       Lipids:   Lab Results   Component Value Date    CHOL 137 12/05/2019    HDL 43 12/05/2019    LDLCALC 82 12/05/2019    TRIG 62 12/05/2019       INR: No results found for: INR, PROTIME    U/A:  Lab Results   Component Value Date    LABMICR Not Indicated 12/05/2019        No results found for: LABA1C     Lab Results   Component Value Date    CREATININE 0.8 12/05/2019       -----------------------------------------------------------------     Assessment/Plan:   1. Polymyalgia rheumatica (HCC)  Problem is stable we will check CPK C-reactive protein and sed rate  - CBC Auto Differential; Future  - Comprehensive Metabolic Panel; Future  - Lipid Panel; Future  - PSA screening; Future  - TSH without Reflex; Future  - Urinalysis; Future  - Vitamin D 25 Hydroxy; Future  - SEDIMENTATION RATE; Future  - C-REACTIVE PROTEIN; Future  - CK; Future    2. Primary open angle glaucoma, unspecified glaucoma stage, unspecified laterality  This problem is stable followed by ophthalmology    3. PE (physical exam), annual  Check screening labs continue diet and exercise as tolerated Pneumovax given he has gotten the Covid vaccines  - CBC Auto Differential; Future  - Comprehensive Metabolic Panel; Future  - Lipid Panel; Future  - PSA screening; Future  - TSH without Reflex; Future  - Urinalysis; Future  - Vitamin D 25 Hydroxy; Future  - SEDIMENTATION RATE; Future  - C-REACTIVE PROTEIN; Future  - CK; Future    4. Vitamin D deficiency  Check above labs  - Vitamin D 25 Hydroxy; Future    5. Pure hypercholesterolemia  Problem is stable check above labs continue diet and exercise  - CBC Auto Differential; Future  - Comprehensive Metabolic Panel; Future  - Lipid Panel; Future  - PSA screening; Future  - TSH without Reflex; Future  - Urinalysis; Future  - Vitamin D 25 Hydroxy; Future  - SEDIMENTATION RATE; Future  - C-REACTIVE PROTEIN; Future  - CK; Future    6. Screening PSA (prostate specific antigen)  Check PSA  - PSA screening;  Future

## 2021-04-29 DIAGNOSIS — Z12.5 SCREENING PSA (PROSTATE SPECIFIC ANTIGEN): ICD-10-CM

## 2021-04-29 DIAGNOSIS — Z00.00 PE (PHYSICAL EXAM), ANNUAL: ICD-10-CM

## 2021-04-29 DIAGNOSIS — E55.9 VITAMIN D DEFICIENCY: ICD-10-CM

## 2021-04-29 DIAGNOSIS — E78.00 PURE HYPERCHOLESTEROLEMIA: ICD-10-CM

## 2021-04-29 DIAGNOSIS — M35.3 POLYMYALGIA RHEUMATICA (HCC): ICD-10-CM

## 2021-04-29 LAB
A/G RATIO: 2.3 (ref 1.1–2.2)
ALBUMIN SERPL-MCNC: 4.4 G/DL (ref 3.4–5)
ALP BLD-CCNC: 64 U/L (ref 40–129)
ALT SERPL-CCNC: 20 U/L (ref 10–40)
ANION GAP SERPL CALCULATED.3IONS-SCNC: 11 MMOL/L (ref 3–16)
AST SERPL-CCNC: 23 U/L (ref 15–37)
BASOPHILS ABSOLUTE: 0.1 K/UL (ref 0–0.2)
BASOPHILS RELATIVE PERCENT: 1.3 %
BILIRUB SERPL-MCNC: 1 MG/DL (ref 0–1)
BILIRUBIN URINE: NEGATIVE
BLOOD, URINE: NEGATIVE
BUN BLDV-MCNC: 17 MG/DL (ref 7–20)
C-REACTIVE PROTEIN: 5.6 MG/L (ref 0–5.1)
CALCIUM SERPL-MCNC: 9.2 MG/DL (ref 8.3–10.6)
CHLORIDE BLD-SCNC: 105 MMOL/L (ref 99–110)
CHOLESTEROL, TOTAL: 167 MG/DL (ref 0–199)
CLARITY: CLEAR
CO2: 25 MMOL/L (ref 21–32)
COLOR: YELLOW
CREAT SERPL-MCNC: 0.9 MG/DL (ref 0.8–1.3)
EOSINOPHILS ABSOLUTE: 0.1 K/UL (ref 0–0.6)
EOSINOPHILS RELATIVE PERCENT: 2.1 %
GFR AFRICAN AMERICAN: >60
GFR NON-AFRICAN AMERICAN: >60
GLOBULIN: 1.9 G/DL
GLUCOSE BLD-MCNC: 89 MG/DL (ref 70–99)
GLUCOSE URINE: NEGATIVE MG/DL
HCT VFR BLD CALC: 44.4 % (ref 40.5–52.5)
HDLC SERPL-MCNC: 55 MG/DL (ref 40–60)
HEMOGLOBIN: 15.1 G/DL (ref 13.5–17.5)
KETONES, URINE: NEGATIVE MG/DL
LDL CHOLESTEROL CALCULATED: 102 MG/DL
LEUKOCYTE ESTERASE, URINE: NEGATIVE
LYMPHOCYTES ABSOLUTE: 1.6 K/UL (ref 1–5.1)
LYMPHOCYTES RELATIVE PERCENT: 37.7 %
MCH RBC QN AUTO: 32.6 PG (ref 26–34)
MCHC RBC AUTO-ENTMCNC: 34 G/DL (ref 31–36)
MCV RBC AUTO: 95.8 FL (ref 80–100)
MICROSCOPIC EXAMINATION: NORMAL
MONOCYTES ABSOLUTE: 0.4 K/UL (ref 0–1.3)
MONOCYTES RELATIVE PERCENT: 9.9 %
NEUTROPHILS ABSOLUTE: 2.1 K/UL (ref 1.7–7.7)
NEUTROPHILS RELATIVE PERCENT: 49 %
NITRITE, URINE: NEGATIVE
PDW BLD-RTO: 13.7 % (ref 12.4–15.4)
PH UA: 6.5 (ref 5–8)
PLATELET # BLD: 193 K/UL (ref 135–450)
PMV BLD AUTO: 9.4 FL (ref 5–10.5)
POTASSIUM SERPL-SCNC: 4.7 MMOL/L (ref 3.5–5.1)
PROSTATE SPECIFIC ANTIGEN: 1.65 NG/ML (ref 0–4)
PROTEIN UA: NEGATIVE MG/DL
RBC # BLD: 4.64 M/UL (ref 4.2–5.9)
SEDIMENTATION RATE, ERYTHROCYTE: 2 MM/HR (ref 0–20)
SODIUM BLD-SCNC: 141 MMOL/L (ref 136–145)
SPECIFIC GRAVITY UA: 1.02 (ref 1–1.03)
TOTAL CK: 141 U/L (ref 39–308)
TOTAL PROTEIN: 6.3 G/DL (ref 6.4–8.2)
TRIGL SERPL-MCNC: 49 MG/DL (ref 0–150)
TSH SERPL DL<=0.05 MIU/L-ACNC: 2.61 UIU/ML (ref 0.27–4.2)
URINE TYPE: NORMAL
UROBILINOGEN, URINE: 0.2 E.U./DL
VITAMIN D 25-HYDROXY: 42.2 NG/ML
VLDLC SERPL CALC-MCNC: 10 MG/DL
WBC # BLD: 4.4 K/UL (ref 4–11)

## 2021-07-08 ENCOUNTER — APPOINTMENT (OUTPATIENT)
Dept: GENERAL RADIOLOGY | Age: 66
End: 2021-07-08
Payer: MEDICARE

## 2021-07-08 ENCOUNTER — HOSPITAL ENCOUNTER (EMERGENCY)
Age: 66
Discharge: HOME | End: 2021-07-08
Attending: EMERGENCY MEDICINE
Payer: MEDICARE

## 2021-07-08 VITALS
HEART RATE: 66 BPM | RESPIRATION RATE: 18 BRPM | SYSTOLIC BLOOD PRESSURE: 147 MMHG | TEMPERATURE: 97.8 F | OXYGEN SATURATION: 98 % | DIASTOLIC BLOOD PRESSURE: 81 MMHG

## 2021-07-08 DIAGNOSIS — M62.838 SPASM OF MUSCLE: Primary | ICD-10-CM

## 2021-07-08 LAB
ANION GAP SERPL CALCULATED.3IONS-SCNC: 10 MMOL/L (ref 3–16)
BASOPHILS ABSOLUTE: 0 K/UL (ref 0–0.2)
BASOPHILS RELATIVE PERCENT: 0.5 %
BUN BLDV-MCNC: 15 MG/DL (ref 7–20)
CALCIUM SERPL-MCNC: 9.3 MG/DL (ref 8.3–10.6)
CHLORIDE BLD-SCNC: 101 MMOL/L (ref 99–110)
CO2: 25 MMOL/L (ref 21–32)
CREAT SERPL-MCNC: 0.7 MG/DL (ref 0.8–1.3)
D DIMER: <200 NG/ML DDU (ref 0–229)
EOSINOPHILS ABSOLUTE: 0.1 K/UL (ref 0–0.6)
EOSINOPHILS RELATIVE PERCENT: 0.8 %
GFR AFRICAN AMERICAN: >60
GFR NON-AFRICAN AMERICAN: >60
GLUCOSE BLD-MCNC: 138 MG/DL (ref 70–99)
HCT VFR BLD CALC: 46.4 % (ref 40.5–52.5)
HEMOGLOBIN: 15.9 G/DL (ref 13.5–17.5)
LYMPHOCYTES ABSOLUTE: 1.6 K/UL (ref 1–5.1)
LYMPHOCYTES RELATIVE PERCENT: 18.4 %
MCH RBC QN AUTO: 32.9 PG (ref 26–34)
MCHC RBC AUTO-ENTMCNC: 34.3 G/DL (ref 31–36)
MCV RBC AUTO: 95.8 FL (ref 80–100)
MONOCYTES ABSOLUTE: 0.7 K/UL (ref 0–1.3)
MONOCYTES RELATIVE PERCENT: 8.4 %
NEUTROPHILS ABSOLUTE: 6.1 K/UL (ref 1.7–7.7)
NEUTROPHILS RELATIVE PERCENT: 71.9 %
PDW BLD-RTO: 13.4 % (ref 12.4–15.4)
PLATELET # BLD: 205 K/UL (ref 135–450)
PMV BLD AUTO: 8.9 FL (ref 5–10.5)
POTASSIUM REFLEX MAGNESIUM: 3.9 MMOL/L (ref 3.5–5.1)
RBC # BLD: 4.84 M/UL (ref 4.2–5.9)
SODIUM BLD-SCNC: 136 MMOL/L (ref 136–145)
TROPONIN: <0.01 NG/ML
WBC # BLD: 8.5 K/UL (ref 4–11)

## 2021-07-08 PROCEDURE — 36415 COLL VENOUS BLD VENIPUNCTURE: CPT

## 2021-07-08 PROCEDURE — 71046 X-RAY EXAM CHEST 2 VIEWS: CPT

## 2021-07-08 PROCEDURE — 99283 EMERGENCY DEPT VISIT LOW MDM: CPT

## 2021-07-08 PROCEDURE — 93005 ELECTROCARDIOGRAM TRACING: CPT | Performed by: PHYSICIAN ASSISTANT

## 2021-07-08 PROCEDURE — 85025 COMPLETE CBC W/AUTO DIFF WBC: CPT

## 2021-07-08 PROCEDURE — 85379 FIBRIN DEGRADATION QUANT: CPT

## 2021-07-08 PROCEDURE — 84484 ASSAY OF TROPONIN QUANT: CPT

## 2021-07-08 PROCEDURE — 6370000000 HC RX 637 (ALT 250 FOR IP): Performed by: PHYSICIAN ASSISTANT

## 2021-07-08 PROCEDURE — 80048 BASIC METABOLIC PNL TOTAL CA: CPT

## 2021-07-08 RX ORDER — LIDOCAINE 4 G/G
1 PATCH TOPICAL ONCE
Status: DISCONTINUED | OUTPATIENT
Start: 2021-07-08 | End: 2021-07-08 | Stop reason: HOSPADM

## 2021-07-08 RX ORDER — CYCLOBENZAPRINE HCL 10 MG
10 TABLET ORAL ONCE
Status: COMPLETED | OUTPATIENT
Start: 2021-07-08 | End: 2021-07-08

## 2021-07-08 RX ORDER — CYCLOBENZAPRINE HCL 10 MG
10 TABLET ORAL 3 TIMES DAILY PRN
Qty: 30 TABLET | Refills: 0 | Status: SHIPPED | OUTPATIENT
Start: 2021-07-08 | End: 2021-07-18

## 2021-07-08 RX ADMIN — CYCLOBENZAPRINE HYDROCHLORIDE 10 MG: 10 TABLET, FILM COATED ORAL at 20:38

## 2021-07-08 NOTE — ED PROVIDER NOTES
[facility]          PHYSICIAN ASSISTANT NOTE       Date of evaluation: 7/8/2021    Chief Complaint     Back Pain (states that today he has had upper left back pain that goes into the left side of his neck, states that with a deep breath it hurts, it also hurts to touch. denies N/V/D. SOB or CP.)      History of Present Illness     Caren Rondon is a 72 y.o. male with past medical history of PMR and glaucoma who presents to the emergency department complaining of left upper back pain. The patient states that he woke up today with a nagging sensation in his left upper back that gets worse with deep breathing. He denies any radiation of the pain or distal numbness/tingling/weakness in his left arm. He denies neck pain, midline back pain, fevers, chills, chest pain, shortness of breath, lower extremity swelling or pain. Of note, the patient returned from a road trip from [place] last week where he did a cycling ultra marathon. He has no family history of cardiac disease and does not smoke. Review of Systems     Review of Systems  As above or is otherwise negative. Past Medical, Surgical, Family, and Social History     He has a past medical history of PMR (polymyalgia rheumatica) ([facility] [address] Polymyalgia rheumatica ([facility] [address] and Spontaneous pneumothorax. He has a past surgical history that includes Appendectomy; chest tube insertion; and Colonoscopy (N/A, 12/9/2019). His family history includes Cancer in his father and mother; Heart Disease in his brother; Other in his brother and mother; Prostate Cancer in his father. He reports that he has never smoked. He has never used smokeless tobacco. He reports current alcohol use. He reports that he does not use drugs.     Medications     Previous Medications    DORZOLAMIDE (TRUSOPT) 2 % OPHTHALMIC SOLUTION    2 drops 3 times daily    LATANOPROST (XALATAN) 0.005 % OPHTHALMIC SOLUTION    1 drop nightly       Allergies     He has No Known Allergies. Physical Exam     INITIAL VITALS: BP: (!) 160/75, Temp: 97.8 °F (36.6 °C), Pulse: 65, Resp: 19, SpO2: 100 %  Physical Exam  Constitutional:       General: He is not in acute distress. Appearance: Normal appearance. He is normal weight. He is not ill-appearing. HENT:      Head: Normocephalic and atraumatic. Cardiovascular:      Rate and Rhythm: Normal rate and regular rhythm. Heart sounds: No murmur heard. No friction rub. No gallop. Pulmonary:      Effort: Pulmonary effort is normal.      Breath sounds: Normal breath sounds. Abdominal:      General: There is no distension. Palpations: Abdomen is soft. Tenderness: There is no abdominal tenderness. Musculoskeletal:         General: No swelling or tenderness. Normal range of motion. Cervical back: Normal range of motion and neck supple. No rigidity or tenderness. Right lower leg: No edema. Left lower leg: No edema. Comments: Point tenderness to palpation in his left parascapular musculature, no midline spinal tenderness to palpation. Left shoulder range of motion intact. Distally neurovascularly intact. Skin:     General: Skin is warm and dry. Neurological:      General: No focal deficit present. Mental Status: He is alert and oriented to person, place, and time. Psychiatric:         Mood and Affect: Mood normal.         Behavior: Behavior normal.         Diagnostic Results     EKG   Interpreted in conjunction with emergency department physician No att. providers found  Rhythm: normal sinus   Rate: normal  Axis: normal  Ectopy: none  Conduction: normal  ST Segments: no acute change  T Waves: normal  Q Waves:none  Clinical Impression: no acute changes  Comparison:  No prior    RADIOLOGY:  XR CHEST (2 VW)   Final Result      No acute pulmonary disease.              LABS:   Results for orders placed or performed during the hospital encounter of 07/08/21   CBC Auto Differential   Result Value Ref Range    WBC 8.5 4.0 - 11.0 K/uL    RBC 4.84 4.20 - 5.90 M/uL    Hemoglobin 15.9 13.5 - 17.5 g/dL    Hematocrit 46.4 40.5 - 52.5 %    MCV 95.8 80.0 - 100.0 fL    MCH 32.9 26.0 - 34.0 pg    MCHC 34.3 31.0 - 36.0 g/dL    RDW 13.4 12.4 - 15.4 %    Platelets 946 695 - 086 K/uL    MPV 8.9 5.0 - 10.5 fL    Neutrophils % 71.9 %    Lymphocytes % 18.4 %    Monocytes % 8.4 %    Eosinophils % 0.8 %    Basophils % 0.5 %    Neutrophils Absolute 6.1 1.7 - 7.7 K/uL    Lymphocytes Absolute 1.6 1.0 - 5.1 K/uL    Monocytes Absolute 0.7 0.0 - 1.3 K/uL    Eosinophils Absolute 0.1 0.0 - 0.6 K/uL    Basophils Absolute 0.0 0.0 - 0.2 K/uL   Basic Metabolic Panel w/ Reflex to MG   Result Value Ref Range    Sodium 136 136 - 145 mmol/L    Potassium reflex Magnesium 3.9 3.5 - 5.1 mmol/L    Chloride 101 99 - 110 mmol/L    CO2 25 21 - 32 mmol/L    Anion Gap 10 3 - 16    Glucose 138 (H) 70 - 99 mg/dL    BUN 15 7 - 20 mg/dL    CREATININE 0.7 (L) 0.8 - 1.3 mg/dL    GFR Non-African American >60 >60    GFR African American >60 >60    Calcium 9.3 8.3 - 10.6 mg/dL   Troponin   Result Value Ref Range    Troponin <0.01 <0.01 ng/mL   D-Dimer, Quantitative   Result Value Ref Range    D-Dimer, Quant <200 0 - 229 ng/mL DDU         RECENT VITALS:  BP: (!) 147/81, Temp: 97.8 °F (36.6 °C), Pulse: 66, Resp: 18, SpO2: 98 %     Procedures     None    ED Course     Nursing Notes, Past Medical Hx,Past Surgical Hx, Social Hx, Allergies, and Family Hx were reviewed.     The patient was given the following medications:  Orders Placed This Encounter   Medications    lidocaine 4 % external patch 1 patch    cyclobenzaprine (FLEXERIL) tablet 10 mg    cyclobenzaprine (FLEXERIL) 10 MG tablet     Sig: Take 1 tablet by mouth 3 times daily as needed for Muscle spasms     Dispense:  30 tablet     Refill:  0       CONSULTS:  None    MEDICAL DECISION MAKING / ASSESSMENT / Connie Moose is a 72 y.o. male with past medical history of PMR and glaucoma who presents to the amenable with this plan, and at this time is stable for discharge. Patient remained hemodynamically stable throughout his stay in the emergency department. This patient was also evaluated by the attending physician. All care plans were discussed and agreed upon. Clinical Impression     1.  Spasm of muscle        Disposition     PATIENT REFERRED TO:  Blaze Fernandez MD  [address] [id number] [phone]      As needed, If symptoms worsen      DISCHARGE MEDICATIONS:  New Prescriptions    CYCLOBENZAPRINE (FLEXERIL) 10 MG TABLET    Take 1 tablet by mouth 3 times daily as needed for Muscle spasms       NOHEMY Cook  07/08/21 [id number]

## 2021-07-09 LAB
EKG ATRIAL RATE: 67 BPM
EKG DIAGNOSIS: NORMAL
EKG P AXIS: 33 DEGREES
EKG P-R INTERVAL: 166 MS
EKG Q-T INTERVAL: 410 MS
EKG QRS DURATION: 84 MS
EKG QTC CALCULATION (BAZETT): 433 MS
EKG R AXIS: 66 DEGREES
EKG T AXIS: 46 DEGREES
EKG VENTRICULAR RATE: 67 BPM

## 2021-07-09 NOTE — ED PROVIDER NOTES
ED Attending Attestation Note     Date of evaluation: 7/8/2021    This patient was seen by the CASSIDY. I have seen and examined the patient, agree with the workup, evaluation, management and diagnosis. The care plan has been discussed. I have reviewed the ECG and concur with the CASSIDY's interpretation. I was present for any procedures performed in the CASSIDY's note and have made edits to the note where appropriate. My assessment reveals 72 y.o. male with history of PMR and spontaneous PTX presenting for chest pain. Low risk for PE, but will obtain D-dimer, CXR to assess for PTX. Doubt ACS. Is noted to be well appearing and without respiratory distress on arrival to his room, but was discharged prior to my formal evaluation.        Ankush Melendez MD  07/08/21 [id number]

## 2021-07-15 ENCOUNTER — TELEPHONE (OUTPATIENT)
Dept: DERMATOLOGY | Age: 66
End: 2021-07-15

## 2021-07-15 NOTE — TELEPHONE ENCOUNTER
Pt calling wanting to know if  removes lymphomas  last time in office was in January pls return call back @ [phone] to discuss

## 2021-08-06 NOTE — TELEPHONE ENCOUNTER
Spoke w/ pt and confirmed that is was referring to a lipoma and NOT lymphomas. Per Dr. Og Davila, schedule pt 10/8 at 2.

## 2021-09-30 ENCOUNTER — TELEPHONE (OUTPATIENT)
Dept: DERMATOLOGY | Age: 66
End: 2021-09-30

## 2021-09-30 NOTE — TELEPHONE ENCOUNTER
Called spoke with patient letting him know appt for 10/8 is canceled and due to provider being out of office and someone from the office will c/b to reschedule at a later time. Pt understood.

## 2021-10-22 ENCOUNTER — TELEPHONE (OUTPATIENT)
Dept: DERMATOLOGY | Age: 66
End: 2021-10-22

## 2021-10-22 NOTE — TELEPHONE ENCOUNTER
Patient's wife is requesting a return call to reschedule 10/8 procedure appt. Paige Angelohn states office cancelled appt and was to call her back to reschedule. Please advise. Thank you!

## 2021-12-10 ENCOUNTER — OFFICE VISIT (OUTPATIENT)
Dept: DERMATOLOGY | Age: 66
End: 2021-12-10
Payer: MEDICARE

## 2021-12-10 VITALS — TEMPERATURE: 98.2 F

## 2021-12-10 DIAGNOSIS — L81.4 SOLAR LENTIGINOSIS: ICD-10-CM

## 2021-12-10 DIAGNOSIS — D17.1 LIPOMA OF TORSO: Primary | ICD-10-CM

## 2021-12-10 DIAGNOSIS — L82.1 SEBORRHEIC KERATOSIS: ICD-10-CM

## 2021-12-10 DIAGNOSIS — L57.3 POIKILODERMA OF CIVATTE: ICD-10-CM

## 2021-12-10 DIAGNOSIS — L72.0 MILIA: ICD-10-CM

## 2021-12-10 DIAGNOSIS — L57.0 ACTINIC KERATOSIS: ICD-10-CM

## 2021-12-10 DIAGNOSIS — L73.8 SEBACEOUS HYPERPLASIA: ICD-10-CM

## 2021-12-10 DIAGNOSIS — D48.5 NEOPLASM OF UNCERTAIN BEHAVIOR OF SKIN: ICD-10-CM

## 2021-12-10 DIAGNOSIS — D22.9 MULTIPLE BENIGN MELANOCYTIC NEVI: ICD-10-CM

## 2021-12-10 PROCEDURE — G8427 DOCREV CUR MEDS BY ELIG CLIN: HCPCS | Performed by: DERMATOLOGY

## 2021-12-10 PROCEDURE — 17000 DESTRUCT PREMALG LESION: CPT | Performed by: DERMATOLOGY

## 2021-12-10 PROCEDURE — G8484 FLU IMMUNIZE NO ADMIN: HCPCS | Performed by: DERMATOLOGY

## 2021-12-10 PROCEDURE — 99214 OFFICE O/P EST MOD 30 MIN: CPT | Performed by: DERMATOLOGY

## 2021-12-10 PROCEDURE — 1123F ACP DISCUSS/DSCN MKR DOCD: CPT | Performed by: DERMATOLOGY

## 2021-12-10 PROCEDURE — 4040F PNEUMOC VAC/ADMIN/RCVD: CPT | Performed by: DERMATOLOGY

## 2021-12-10 PROCEDURE — 1036F TOBACCO NON-USER: CPT | Performed by: DERMATOLOGY

## 2021-12-10 PROCEDURE — 11102 TANGNTL BX SKIN SINGLE LES: CPT | Performed by: DERMATOLOGY

## 2021-12-10 PROCEDURE — G8420 CALC BMI NORM PARAMETERS: HCPCS | Performed by: DERMATOLOGY

## 2021-12-10 PROCEDURE — 3017F COLORECTAL CA SCREEN DOC REV: CPT | Performed by: DERMATOLOGY

## 2021-12-10 RX ORDER — PREDNISONE 1 MG/1
10 TABLET ORAL DAILY
COMMUNITY
Start: 2021-11-11 | End: 2022-01-20 | Stop reason: DRUGHIGH

## 2021-12-10 NOTE — PATIENT INSTRUCTIONS
Wound care instructions    1. Keep bandage dry for 24 hrs. 2. After 24 hrs, clean with soap and water twice daily  3. Apply Vaseline or Aquaphor ointment twice daily and cover with small dressing or Band-Aid for 7-10 days. Studies show that wounds heal better when covered with ointment and a bandage. FREQUENTLY ASKED QUESTIONS:    [facility] It is OK to get the wound wet when washing or bathing.  If bleeding should occur, apply firm direct pressure for 20 minutes CONTINUOUSLY. After 20 minutes, you may check to see if bleeding has stopped. If bleeding persists, please repeat CONTINUOUS PRESSURE for another 20 minutes WITHOUT LOOKING. o If bleeding still persists, call the office at [phone]   Signs of infection include increased redness extending over 1 centimeter from the wound, increased warmth, yellow to green purulent (pus-like) discharge, and significantly increased tenderness to the touch. If you have any concerns regarding infection or develop fevers or chills, please call [phone]   You will be contacted with the results of your procedure when we receive them, usually within two weeks. Please call if you have not heard from us. Cryosurgery (Liquid Nitrogen Treatment): Aftercare instructions    Cryosurgery is the use of a cold, liquefied gas to remove superficial skin growths such as warts and keratoses. This liquefied gas is cooled to around 300? below 0? F. When it comes in contact with a skin growth, the growth is frozen and should fall away or shrink. What to expect:  After treatment, your skin will become red and swollen and may develop a blister or scab. Healing takes 1 to 3 weeks, after which the skin may look perfectly normal or may be slightly lighter in color. Sometimes the growth will not disappear completely and multiple treatments may be needed. This is especially true for warts.      Proper care after the procedure:    Clean the treated area with soap and water as you normally would. It is fine to get the area wet; however, be sure to blot it dry carefully and delicately.  If necessary, you can open the blister with a sterilized needle.  If a scab or small wound forms, apply a thin layer of Vaseline/Aquaphor ointment to the site twice daily.  If the site is prone to irritation, cover it with a bandage.  You will likely see a crust form after a few days. This crust will fall off on its own. Skin Cancer Prevention: After Your Visit    Skin cancer is the abnormal growth of cells in the skin. It usually appears as a growth that changes in color, shape, or size. This can be a sore that does not heal or a change in a wart or a mole. Skin cancer is almost always curable when found early and treated. So it is important to see your doctor if you have any of these changes in your skin. Skin cancer is the most common type of cancer. It often appears on areas of the body that have been exposed to the sun, such as the head, face, neck, back, chest, or shoulders. Follow-up care is a key part of your treatment and safety. Be sure to make and go to all appointments, and call your doctor if you are having problems. It's also a good idea to know your test results and keep a list of the medicines you take. How can you care for yourself at home?  - Wear a wide-brimmed hat and long sleeves and pants if you are going to be outdoors for a long time. - Avoid the sun between 10 a.m. and 4 p.m., which is the peak time for UV rays. - Wear sunscreen on exposed skin. Make sure the sunscreen blocks ultraviolet rays (both UVA and UVB) and has a sun protection factor (SPF) of at least 30. Use it every day, even when it is cloudy. Some doctors may recommend a higher SPF, such as 48.  - Do not use tanning booths or sunlamps. - Use lip balm or cream that has sun protection factor (SPF) to protect your lips from getting sunburned or getting cold sores.   - Wear sunglasses that block UV rays. When should you call for help? Watch closely for changes in your health, and be sure to contact your doctor if:  - You are concerned about any problem areas on your skin. - You notice a change in a mole or skin growth. For example:  a. It gets bigger. b. It develops uneven borders. c. It gets thicker, raised, or worn down. d. It changes color. e. It starts to bleed easily. A-B-C-D-E guide for Melanoma     Characteristics of unusual moles that may indicate melanomas or other skin cancers follow the A-B-C-D-E guide developed by the American Academy [employer]ermat[employer]:  2. A is for asymmetrical shape. Look for moles with irregular shapes, such as two very different-looking halves. 3. B is for irregular border. Look for moles with irregular, notched or scalloped borders, these may be characteristics of melanomas. 4. C is for changes in color. Look for growths that have many colors or an uneven distribution of color. 5. D is for diameter. Look for new growth in a mole larger than about 1/4 inch (6 millimeters) or about the size of a pencil eraser. 6. E is for evolving. This is the most important one. Look for changes over time, such as a mole that grows in size or that changes color or shape. Marylin Huntley may also evolve to develop new signs and symptoms, such as new itchiness or bleeding. If any of your moles appear to be changing, see your doctor. Other suspicious changes in a mole may include: Scaly skin, itching, spreading of color from the mole into the surrounding skin, oozing or bleeding. Cancerous (malignant) moles vary greatly in appearance. Some may show all of the changes listed above, while others may have only one or two unusual characteristics. Please call and schedule an appointment if you have any concerns or questions. Dr. Patrecia Stanford Six Terrilee Bamberger Safe Strategies:    1.  Avoid the sun if possible especially between the hours of 10 am and 4 pm. That's when the sun's most harmful UV rays are hitting the earth. 2. Use protective clothing. Just like the right equipment helps you perform better in your sport, the proper clothing can do a lot to help us in our fight to prevent skin cancer. Thicker and darker clothing with a tighter weave will block more of the suns harmful rays, and it never wears off! Make wearing long sleeves, a broad brimmed hat and sun glasses part of your routine. 3. Use a broad spectrum sunscreen with SPF 30 or higher on all your exposed skin. Make sure the sunscreen has either titanium dioxide, and/or zinc oxide (preferred), or Avobenzone in it (check the active ingredients on the back of the bottle to make sure). If you are going to be in the water or sweating, make sure the sunscreen you choose is water resistant. Better sunscreens are available in [place] ([facility]) and [facility] like Tinosorb S (Bemotrizinol) and Tinosorb M ([address] [place] [address]), so if you are serious about sun protection and visit those areas, feel free to stock up. 4. Reapply the sunscreen after 2 hours or after swimming, sweating, or toweling off. We don't care about the brand, but some good brands to use are Jose Beltran MD , [facility], and really just about anything with the above ingredients. Cetaphil oil control moisturizer with SPF 30 or 50 is a good one for the face. It is not water resistant but goes on really light and won't make you break out. 5. Use enough. One ounce of lotion, which is about the size of a golf ball, is the amount needed to cover the exposed parts of an average adult body. For sprays, apply until it \"glistens\" on skin (2 seconds of spray per arm, 4 seconds per leg, and 5 to 8 seconds each for the front torso and back). For sticks apply 3 to 4 passes back and forth per area. 6. Get regular check ups.  Studies show that those who check their own skin every month, get checked by their spouse, partner, or significant other several times a year (we recommend you do

## 2021-12-10 NOTE — Clinical Note
Dr. Taina Madrigal,    I am referring this patient for evaluation and excision of a lipoma on his Lt torso and would appreciate your help. Noe Garduno Please let me know if patient needs to call or the your office will reach out to schedule.     Kind regards,  Karen Bills

## 2021-12-10 NOTE — PROGRESS NOTES
Patient's Name: Fran Rg  MRN: [id number]  YOB: 1955  Date of Visit: 12/10/2021  Primary Care Provider: America Rojas MD  Referring Provider: No ref. provider found    Subjective:     Chief Complaint   Patient presents with    Other     Possible lipoma and non healing lesion    Skin Exam        History of Present Illness:  Fran hall 77 y.o. male with h/o NMSC is a follow up patient to my practice. They were last seen in clinic on 1/29/21     He presents today for concern of a lesion on his Lt torso, he thinks is a lipoma. He denies any pain, however lesion has enlarged. Additionally patient reports a new lesion on his Lt forearm that has been present for several months. He reports lesion doesn't change or resolve. He denies pain, bleeding, pruritus. He continues to complain of the lesion on his Lt thigh. It has been present for several years and is unchanged. Denies any other changing, bleeding, painful, or pruritic lesions/moles. Past Dermatologic History:  Patient reports a personal history of skin cancer. Bowen's disease of left anterior thigh s/p ED&C on 8/22/20  Patient denies a personal history of melanoma. Patient reports a family history of skin cancer. Patient reports a history of blistering sunburns. Patient denies a history of tanning bed use. Patient currently is compliant with using sun-protective measures. Past medical/surgical/family history reviewed with no changes since last visit on 1/29/21      Allergies:  No Known Allergies    Current Medications:  Current Outpatient Medications   Medication Sig Dispense Refill    predniSONE (DELTASONE) 5 MG tablet Take 10 mg by mouth daily      dorzolamide (TRUSOPT) 2 % ophthalmic solution 2 drops 3 times daily      latanoprost (XALATAN) 0.005 % ophthalmic solution 1 drop nightly       No current facility-administered medications for this visit.        Review of Systems:  Constitutional: No fevers, chills or recent illness. Skin: Skin:As per HPI AND otherwise no new, bleeding or symptomatic skin lesions      Objective:     Vitals:    12/10/21 0909   Temp: 98.2 °F (36.8 °C)       Physical Examination:  General: alert, comfortable, no apparent distress, well-appearing  Psych: alert, oriented and pleasant  Neuro: oriented to person, place, and time  Skin: Areas examined: head including face, lips, conjunctiva and lids, neck, hair/scalp, chest, including breasts and axilla, abdomen, back, buttocks, right upper extremity, left upper extremity, right lower extremity, left lower extremity, left hand, right hand, digits and nails, Right foot, Left foot and toe nails      All areas examined were within normal limits except those listed below with the appropriate assessment and plan    Assessment and Plan (with relevant objective exam findings):     1. Lipoma  Location: Lt lateral torso  Objective findings: firm, subcutaneous rubbery nodule which is  painful to palpation. Growths such as this are most likely benign, but can be removed surgically when the diagnosis is in question, or they are irritated, growing or causing pain. Excision will produce a scar that may be more noticeable than the original spot     Excision is recommended for diagnostic and therapeutic purposes due to growth and discomfort it is causing      2. Neoplasm uncertain behavior, skin  Location: Lt dorsal ulnar forearm  Objective findings: erythematous scaly plaque  Ddx: lichenoid AK vs BCC vs SCCIS  Biopsy today. See procedure note below. The specimen was sent to pathology for diagnosis. We will call patient or send note on Mychart with biopsy results as soon as they are available, and discuss treatment options as needed. Wound care was discussed and written instructions also given to patient.        3. Actinic Keratosis    Objective findings: erythematous, gritty, scaly keratotic  papule with slight hyperkeratosis located on: Rt lateral eyebrow    The patient was told that actinic keratosis(es) represent precancers of the skin that have a small chance of developing into squamous cell carcinoma. Discussed options for therapy and risks/benefits and alternatives with patient including Liquid nitrogen, photodynamic therapy, topical field therapy (fluorouracil, imiquimod, other), etc     Decision was made to do the following:  -LN2 to 1 lesion.  -Edu re: risk of blister formation, discomfort, scar, hypopigmentation. See procedure note below  -Edu re: relationship with chronic cumulative sun exposure, low premalignant potential.   Discussed wound care. -Reviewed sun protective behavior -- sun avoidance during the peak hours of the day, sun-protective clothing (including hat and sunglasses), sunscreen use (water resistant, broad spectrum, SPF at least 50, need for reapplication every 2 to 3 hours). 4. Seborrheic Keratosis  Location: Forehead, Rt temple, cheek, breast, chest, back  Objective: Waxy, stuck on keratotic brown  papules. -Reassurance provided to the patient regarding their chronic and benign nature. No treatment performed    5. Multiple benign melanocytic nevi   Location: Torso, extremities, Rt posterior neck and Rt buttock  Objective findings: multiple brown/pink macules and papules without abnormal findings or concerning findings on exam and dermatoscopy    -Counseled the patient that these are benign and need no therapy. Observation for any changes recommended     6. Sebaceous hyperplasia. Location: Forehead  Objective findings: yellow umbilicated papule(s) with blood vessels overlying them on dermatoscopy. -Reassurance, re: benign nature. 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 Discussed that this is most likely caused by a combination of genetic factors and long term sun exposure.  Terressa Paganini protection is porter to prevent further damage. The skin changes are benign and can be treated cosmetically with either IPL or Pulsed Dye Laser      8. Milia  Location: neck  Objective: 1-2 mm Small firm white papules. -Reassurance, benign small cyst.  No treatment is necessary       9. Solar Lentiginosis    Location: sun exposed areas, most prominently on the face, forearms and shoulders      Objective: Numerous lacy brown macules ranging in size from 2-10 mm diameter. The lentigines seen today are benign in character, caused by the sun, and do not require treatment. However, they do occasionally transform into a malignancy, so the patient needs to monitor for changes. They were educated on what a suspicious change would include, change in size or color, and included education on the ABCDs of melanoma. Follow up:  Return visit in 1 month for procedures (Lt thigh and Lt lateral torso) or as needed for change in condition. All questions addressed. Procedure:   Procedure:  (BIOPSY BY SHAVE TECHNIQUE) (75185)  Location:  Lt dorsal ulnar forearm  Indication: Neoplasm uncertain behavior, skin    Risks, benefits, and alternatives were explained and verbal informed consent was obtained. The area was photographed with patient's permission. The area was cleaned with alcohol and infiltrated with 1% lidocaine with epinephrine. After adequate analgesia had been confirmed, the lesion was then biopsied by shave technique using a dermablade. Hemostasis was achieved with aluminum chloride. Vaseline or antibiotic ointment and sterile dressing were applied and wound care instructions were given verbally and in writing. The patient tolerated the procedure well with no complications. The patient will be notified by mail or telephone of pathology results and is instructed to call us if they have not received notification within two weeks.       Procedure: Cryotherapy  Location(s):  Rt eyebrow  Indication:  premalignant  Total Number of Lesions:  1    Risks, benefits and alternatives were explained and verbal informed consent was obtained. The lesion was treated with cryotherapy, using liquid nitrogen, for 2 freeze cycles of approximately 10 seconds each. Skin was allowed to thaw completely before each cycle. The patient was advised to have wound examined if problems with healing occur, or evidence of infection develops. Wound care instructions were reviewed with the patient and He was advised that if the site is not completely resolved at follow-up, re-treatment with this or other method should be considered. The patient tolerated the procedure well without complications.       Romeo Bermeo MD. MS

## 2021-12-15 LAB — DERMATOLOGY PATHOLOGY REPORT: ABNORMAL

## 2022-01-03 ENCOUNTER — HOSPITAL ENCOUNTER (EMERGENCY)
Age: 67
Discharge: HOME | End: 2022-01-03
Payer: MEDICARE

## 2022-01-03 ENCOUNTER — APPOINTMENT (OUTPATIENT)
Dept: GENERAL RADIOLOGY | Age: 67
End: 2022-01-03
Payer: MEDICARE

## 2022-01-03 VITALS
TEMPERATURE: 99 F | SYSTOLIC BLOOD PRESSURE: 121 MMHG | WEIGHT: 137.7 LBS | RESPIRATION RATE: 16 BRPM | OXYGEN SATURATION: 100 % | DIASTOLIC BLOOD PRESSURE: 67 MMHG | HEART RATE: 74 BPM | BODY MASS INDEX: 19.76 KG/M2

## 2022-01-03 DIAGNOSIS — Z20.822 SUSPECTED COVID-19 VIRUS INFECTION: Primary | ICD-10-CM

## 2022-01-03 LAB
RAPID INFLUENZA  B AGN: NEGATIVE
RAPID INFLUENZA A AGN: NEGATIVE
S PYO AG THROAT QL: NEGATIVE

## 2022-01-03 PROCEDURE — 87880 STREP A ASSAY W/OPTIC: CPT

## 2022-01-03 PROCEDURE — 87804 INFLUENZA ASSAY W/OPTIC: CPT

## 2022-01-03 PROCEDURE — U0005 INFEC AGEN DETEC AMPLI PROBE: HCPCS

## 2022-01-03 PROCEDURE — 99283 EMERGENCY DEPT VISIT LOW MDM: CPT

## 2022-01-03 PROCEDURE — 87081 CULTURE SCREEN ONLY: CPT

## 2022-01-03 PROCEDURE — U0003 INFECTIOUS AGENT DETECTION BY NUCLEIC ACID (DNA OR RNA); SEVERE ACUTE RESPIRATORY SYNDROME CORONAVIRUS 2 (SARS-COV-2) (CORONAVIRUS DISEASE [COVID-19]), AMPLIFIED PROBE TECHNIQUE, MAKING USE OF HIGH THROUGHPUT TECHNOLOGIES AS DESCRIBED BY CMS-2020-01-R: HCPCS

## 2022-01-03 PROCEDURE — 71046 X-RAY EXAM CHEST 2 VIEWS: CPT

## 2022-01-03 ASSESSMENT — ENCOUNTER SYMPTOMS
COUGH: 1
NAUSEA: 0
CHEST TIGHTNESS: 0
ABDOMINAL PAIN: 0
DIARRHEA: 0
VOMITING: 0
SHORTNESS OF BREATH: 0
SORE THROAT: 1

## 2022-01-03 ASSESSMENT — PAIN SCALES - GENERAL: PAINLEVEL_OUTOF10: 8

## 2022-01-03 NOTE — ED PROVIDER NOTES
[facility]        Pt Name: Ho Torres  MRN: [id number]  [place] 1955  Date of evaluation: 1/3/2022  Provider: Cb Hood PA-C  PCP: Easton Thompson MD  Note Started: 10:22 AM EST       CASSIDY. I have evaluated this patient. My supervising physician was available for consultation. CHIEF COMPLAINT       Chief Complaint   Patient presents with    Concern For COVID-19     Pt reports daughter and wife COVID + yesterday, + headache        HISTORY OF PRESENT ILLNESS   (Location, Timing/Onset, Context/Setting, Quality, Duration, Modifying Factors, Severity, Associated Signs and Symptoms)  Note limiting factors. Chief Complaint: Upper respiratory symptoms and fever with Covid exposure    Ho Torres is a 77 y.o. male who presents to the emergency department with difficulties as a pertains the above-mentioned beginning yesterday. Patient's wife and daughter tested Covid positive with rapid test at home. Patient's been experiencing the above-mentioned symptoms since yesterday. Fatigue, malaise, headache, upper respiratory symptoms and documented fever to 102. He has been treating the fever in the home environment with Tylenol. Patient is already on prednisone regularly for polymyalgia rheumatica. He states his current body aches rate to be 8 out of 10 and because of the above-mentioned presents the ED for evaluation and treatment. Patient states that he does have sore throat pain as well as fatigue malaise and headache. Headache is not the worst headache of his life. It is responsive to treatment in the home environment. It was insidious in onset and not associated with symptoms of meningismus. No additional complaints or concerns otherwise noted. Nursing Notes were all reviewed and agreed with or any disagreements were addressed in the HPI.     REVIEW OF SYSTEMS    (2-9 systems for level 4, 10 or more for level 5)     Review social    Drug use: No       SCREENINGS             PHYSICAL EXAM    (up to 7 for level 4, 8 or more for level 5)     ED Triage Vitals [01/03/22 0943]   BP Temp Temp src Pulse Resp SpO2 Height Weight   121/67 99 °F (37.2 °C) -- 74 16 100 % -- 137 lb 11.2 oz (62.5 kg)       Physical Exam  Vitals and nursing note reviewed. Constitutional:       General: He is awake. He is not in acute distress. Appearance: Normal appearance. He is well-developed. He is not ill-appearing, toxic-appearing or diaphoretic. HENT:      Head: Normocephalic and atraumatic. No raccoon eyes, Cooney's sign or laceration. Right Ear: External ear normal.      Left Ear: External ear normal.   Eyes:      General: No scleral icterus. Right eye: No discharge. Left eye: No discharge. Conjunctiva/sclera: Conjunctivae normal.   Neck:      Vascular: No JVD. Cardiovascular:      Rate and Rhythm: Normal rate and regular rhythm. Heart sounds: No murmur heard. No friction rub. No gallop. Pulmonary:      Effort: Pulmonary effort is normal. No accessory muscle usage or respiratory distress. Breath sounds: Normal breath sounds. No wheezing, rhonchi or rales. Musculoskeletal:      Cervical back: Normal range of motion. Skin:     General: Skin is warm and dry. Neurological:      Mental Status: He is alert and oriented to person, place, and time. GCS: GCS eye subscore is 4. GCS verbal subscore is 5. GCS motor subscore is 6. Cranial Nerves: No cranial nerve deficit. Sensory: No sensory deficit. Coordination: Coordination normal.   Psychiatric:         Behavior: Behavior normal. Behavior is cooperative.          DIAGNOSTIC RESULTS   LABS:    Labs Reviewed   RAPID INFLUENZA A/B ANTIGENS    Narrative:     Performed at:  [facility]  [address],  [place], [address]   Phone [phone]   STREP SCREEN GROUP A THROAT    Narrative:     Performed at:  [facility] [address],  [place], [address]   Phone [phone]   CULTURE, [address]   [id number]       When ordered only abnormal lab results are displayed. All other labs were within normal range or not returned as of this dictation. EKG: When ordered, EKG's are interpreted by the Emergency Department Physician in the absence of a cardiologist.  Please see their note for interpretation of EKG. RADIOLOGY:   Non-plain film images such as CT, Ultrasound and MRI are read by the radiologist. Plain radiographic images are visualized and preliminarily interpreted by the ED Provider with the below findings:        Interpretation per the Radiologist below, if available at the time of this note:    XR CHEST (2 VW)   Final Result   No acute process. PROCEDURES   Unless otherwise noted below, none     Procedures    CRITICAL CARE TIME   N/A    CONSULTS:  None      EMERGENCY DEPARTMENT COURSE and DIFFERENTIAL DIAGNOSIS/MDM:   Vitals:    Vitals:    01/03/22 0943   BP: 121/67   Pulse: 74   Resp: 16   Temp: 99 °F (37.2 °C)   SpO2: 100%   Weight: 137 lb 11.2 oz (62.5 kg)       Patient was given the following medications:  Medications - No data to display        The patient's detailed history of present illness is documented as above. Upon arrival to the emergency department the patient's vital signs are as documented. The patient is noted to be hemodynamically stable and afebrile. Physical examination findings are as above. Patient's chest x-ray demonstrates no evidence of acute cardiopulmonary process. Strep screening is negative. Influenza screening is negative and I am highly suspicious that the patient has Covid sent to family members do as well. PCR is currently pending. Supportive care on an outpatient basis strict potential instructions for return and follow-up with the primary care physician as needed.  The patient has been made aware of the signs and symptoms which would necessitate an immediate return to the emergency department and verbalizes an understanding of these signs and symptoms. Unfortunate because the patient is on steroids we are not able to use nonsteroidal anti-inflammatory medications concomitantly. I suggested NyQuil and DayQuil to control symptoms but not to supplement additional Tylenol with these medications if he wishes to use them. I did offer him Tessalon here in the emergency department but he states it is more difficulty trying to get a restful night sleep than it is the cough and he did not think that he needed a prescription. The presentation is consistent with an upper respiratory infection and the exact etiology of this is not currently noted. This may be a result of viral causes including COVID-19. There is no current evidence to suggest sepsis, meningitis, epiglottitis, pneumonia, acute bacterial sinusitis, streptococcal pharyngitis, otitis media, or other bacterial infection that would be managed with antibiotics. The patient is tolerating by mouth without difficulty and is in evidence of acute distress on examination. Breath sounds are clear to ascultation. Vital signs are unremarkable for significant abnormality. Supportive care recommended at this time and the patient can be managed on an outpatient basis with follow-up with their primary care provider. Strict return precautions given for changing or worsening pain, trouble swallowing or breating, neck stiffness, fever, or rash. FINAL IMPRESSION      1.  Suspected COVID-19 virus infection          DISPOSITION/PLAN   DISPOSITION: Discharged to home      PATIENT REFERRED TO:  Allison Hu MD  [address] [facility]  [phone]      As needed    [facility] Emergency Department  [address] [facility]  [phone]    If symptoms worsen      DISCHARGE MEDICATIONS:  New Prescriptions    No medications on file DISCONTINUED MEDICATIONS:  Discontinued Medications    No medications on file              (Please note that portions of this note were completed with a voice recognition program.  Efforts were made to edit the dictations but occasionally words are mis-transcribed.)    Zohra Love PA-C (electronically signed)           Charles Walter PA-C  01/03/22 5525

## 2022-01-04 ENCOUNTER — CARE COORDINATION (OUTPATIENT)
Dept: CARE COORDINATION | Age: 67
End: 2022-01-04

## 2022-01-04 LAB — SARS-COV-2: DETECTED

## 2022-01-04 NOTE — CARE COORDINATION
Patient contacted regarding COVID-19 exposure and diagnosis. Discussed COVID-19 related testing which was available at this time. Test results were positive. Patient informed of results, if available? Yes. Ambulatory Care Manager contacted the patient by telephone to perform post discharge assessment. Call within 2 business days of discharge: Yes. Verified name and  with patient as identifiers. Provided introduction to self, and explanation of the CTN/ACM role, and reason for call due to risk factors for infection and/or exposure to COVID-19. Symptoms reviewed with patient who verbalized the following symptoms: fever, fatigue, pain or aching joints, cough, confusion or unusual change in mental status, loss of taste or smell, chills or shaking, nausea, dizziness/lightheadedness, cold, clammy and pale skin, no new symptoms, no worsening symptoms and headache, sore throat, congestion. Instructed pt to stay hydrated, rest, take Tylenol or Ibuprofen for body aches or fever and eat at least small meals. Pt is taking NyQuil at night. Due to no new or worsening symptoms encounter was not routed to provider for escalation. Discussed follow-up appointments. If no appointment was previously scheduled, appointment scheduling offered: Pt will follow up with Dr. Aurora Diez. [facility] follow up appointment(s):   Future Appointments   Date Time Provider Terrance Ling   2022 12:00 PM MD WESLEY Mcdaniel   2022  3:00 PM MD WESLEY Garcia         Non-face-to-face services provided:  Obtained and reviewed discharge summary and/or continuity of care documents     Advance Care Planning:   Does patient have an Advance Directive:  decision maker updated. Educated patient about risk for severe COVID-19 due to risk factors according to CDC guidelines. ACM reviewed discharge instructions, medical action plan and red flag symptoms with the patient who verbalized understanding. Discussed COVID vaccination status: Yes. Education provided on COVID-19 vaccination as appropriate. Discussed exposure protocols and quarantine with CDC Guidelines. Patient was given an opportunity to verbalize any questions and concerns and agrees to contact ACM or health care provider for questions related to their healthcare. Reviewed and educated patient on any new and changed medications related to discharge diagnosis     Was patient discharged with a pulse oximeter? No Discussed and confirmed pulse oximeter discharge instructions and when to notify provider or seek emergency care. ACM provided contact information. No further follow-up call identified based on severity of symptoms and risk factors.

## 2022-01-05 LAB — S PYO THROAT QL CULT: NORMAL

## 2022-01-20 ENCOUNTER — OFFICE VISIT (OUTPATIENT)
Dept: INTERNAL MEDICINE CLINIC | Age: 67
End: 2022-01-20
Payer: MEDICARE

## 2022-01-20 VITALS
HEART RATE: 63 BPM | OXYGEN SATURATION: 99 % | WEIGHT: 138.6 LBS | TEMPERATURE: 97.2 F | DIASTOLIC BLOOD PRESSURE: 80 MMHG | SYSTOLIC BLOOD PRESSURE: 118 MMHG | BODY MASS INDEX: 19.84 KG/M2 | HEIGHT: 70 IN

## 2022-01-20 DIAGNOSIS — G89.29 CHRONIC PAIN OF LEFT KNEE: ICD-10-CM

## 2022-01-20 DIAGNOSIS — Z01.818 PRE-OP EXAM: Primary | ICD-10-CM

## 2022-01-20 DIAGNOSIS — M25.562 CHRONIC PAIN OF LEFT KNEE: ICD-10-CM

## 2022-01-20 DIAGNOSIS — M35.3 POLYMYALGIA RHEUMATICA (HCC): ICD-10-CM

## 2022-01-20 DIAGNOSIS — Z01.818 PRE-OP EXAM: ICD-10-CM

## 2022-01-20 LAB
ANION GAP SERPL CALCULATED.3IONS-SCNC: 12 MMOL/L (ref 3–16)
BASOPHILS ABSOLUTE: 0 K/UL (ref 0–0.2)
BASOPHILS RELATIVE PERCENT: 0.9 %
BUN BLDV-MCNC: 14 MG/DL (ref 7–20)
CALCIUM SERPL-MCNC: 9.2 MG/DL (ref 8.3–10.6)
CHLORIDE BLD-SCNC: 105 MMOL/L (ref 99–110)
CO2: 25 MMOL/L (ref 21–32)
CREAT SERPL-MCNC: 0.9 MG/DL (ref 0.8–1.3)
EOSINOPHILS ABSOLUTE: 0.1 K/UL (ref 0–0.6)
EOSINOPHILS RELATIVE PERCENT: 1 %
GFR AFRICAN AMERICAN: >60
GFR NON-AFRICAN AMERICAN: >60
GLUCOSE BLD-MCNC: 95 MG/DL (ref 70–99)
HCT VFR BLD CALC: 45.1 % (ref 40.5–52.5)
HEMOGLOBIN: 15.4 G/DL (ref 13.5–17.5)
LYMPHOCYTES ABSOLUTE: 1.8 K/UL (ref 1–5.1)
LYMPHOCYTES RELATIVE PERCENT: 33.2 %
MCH RBC QN AUTO: 32.2 PG (ref 26–34)
MCHC RBC AUTO-ENTMCNC: 34.1 G/DL (ref 31–36)
MCV RBC AUTO: 94.3 FL (ref 80–100)
MONOCYTES ABSOLUTE: 0.5 K/UL (ref 0–1.3)
MONOCYTES RELATIVE PERCENT: 8.6 %
NEUTROPHILS ABSOLUTE: 3 K/UL (ref 1.7–7.7)
NEUTROPHILS RELATIVE PERCENT: 56.3 %
PDW BLD-RTO: 14.2 % (ref 12.4–15.4)
PLATELET # BLD: 259 K/UL (ref 135–450)
PMV BLD AUTO: 9.3 FL (ref 5–10.5)
POTASSIUM SERPL-SCNC: 4.2 MMOL/L (ref 3.5–5.1)
RBC # BLD: 4.78 M/UL (ref 4.2–5.9)
SEDIMENTATION RATE, ERYTHROCYTE: 3 MM/HR (ref 0–20)
SODIUM BLD-SCNC: 142 MMOL/L (ref 136–145)
WBC # BLD: 5.3 K/UL (ref 4–11)

## 2022-01-20 PROCEDURE — G8484 FLU IMMUNIZE NO ADMIN: HCPCS | Performed by: INTERNAL MEDICINE

## 2022-01-20 PROCEDURE — 93000 ELECTROCARDIOGRAM COMPLETE: CPT | Performed by: INTERNAL MEDICINE

## 2022-01-20 PROCEDURE — 1123F ACP DISCUSS/DSCN MKR DOCD: CPT | Performed by: INTERNAL MEDICINE

## 2022-01-20 PROCEDURE — 1036F TOBACCO NON-USER: CPT | Performed by: INTERNAL MEDICINE

## 2022-01-20 PROCEDURE — G8427 DOCREV CUR MEDS BY ELIG CLIN: HCPCS | Performed by: INTERNAL MEDICINE

## 2022-01-20 PROCEDURE — 4040F PNEUMOC VAC/ADMIN/RCVD: CPT | Performed by: INTERNAL MEDICINE

## 2022-01-20 PROCEDURE — G8420 CALC BMI NORM PARAMETERS: HCPCS | Performed by: INTERNAL MEDICINE

## 2022-01-20 PROCEDURE — 99214 OFFICE O/P EST MOD 30 MIN: CPT | Performed by: INTERNAL MEDICINE

## 2022-01-20 PROCEDURE — 3017F COLORECTAL CA SCREEN DOC REV: CPT | Performed by: INTERNAL MEDICINE

## 2022-01-20 RX ORDER — PREDNISONE 1 MG/1
2 TABLET ORAL DAILY
COMMUNITY

## 2022-01-20 SDOH — ECONOMIC STABILITY: FOOD INSECURITY: WITHIN THE PAST 12 MONTHS, YOU WORRIED THAT YOUR FOOD WOULD RUN OUT BEFORE YOU GOT MONEY TO BUY MORE.: NEVER TRUE

## 2022-01-20 SDOH — ECONOMIC STABILITY: FOOD INSECURITY: WITHIN THE PAST 12 MONTHS, THE FOOD YOU BOUGHT JUST DIDN'T LAST AND YOU DIDN'T HAVE MONEY TO GET MORE.: NEVER TRUE

## 2022-01-20 ASSESSMENT — PATIENT HEALTH QUESTIONNAIRE - PHQ9
1. LITTLE INTEREST OR PLEASURE IN DOING THINGS: 0
SUM OF ALL RESPONSES TO PHQ QUESTIONS 1-9: 0
SUM OF ALL RESPONSES TO PHQ9 QUESTIONS 1 & 2: 0
SUM OF ALL RESPONSES TO PHQ QUESTIONS 1-9: 0
SUM OF ALL RESPONSES TO PHQ QUESTIONS 1-9: 0
2. FEELING DOWN, DEPRESSED OR HOPELESS: 0
SUM OF ALL RESPONSES TO PHQ QUESTIONS 1-9: 0

## 2022-01-20 ASSESSMENT — SOCIAL DETERMINANTS OF HEALTH (SDOH): HOW HARD IS IT FOR YOU TO PAY FOR THE VERY BASICS LIKE FOOD, HOUSING, MEDICAL CARE, AND HEATING?: NOT HARD AT ALL

## 2022-01-20 NOTE — PROGRESS NOTES
Chief Complaint   Patient presents with    Pre-op Exam     Left knee arthroscopy, Dr. Suad Silva @ [address] [place] [address] Orthopedics-02/08/22    EKG Only     Fax: [phone]     Aldo Olivo is a 77 y.o. male who presents for a preoperative physical examination. He is scheduled to have left knee arthroscopy done by Dr. Suad Silva at [place] [address] on 2/8/22. History of Present Illness:      Ling Valenzuela has been having pain in his left knee for the last 2 months. MRI showed an injury. Past Medical History:   Diagnosis Date    [facility]algia [facility]umatica) [facility]     Polymyalgia rheumatica ([address] 11/27/2019    Spontaneous pneumothorax         Review of patient's past surgical history indicates:     Past Surgical History:   Procedure Laterality Date    APPENDECTOMY      CHEST TUBE INSERTION      COLONOSCOPY N/A 12/9/2019    COLONOSCOPY POLYPECTOMY SNARE/COLD BIOPSY performed by Juan Cerna MD at [address] [place] [address]                                                   Current Outpatient Medications   Medication Sig Dispense Refill    predniSONE (DELTASONE) 1 MG tablet Take 2 mg by mouth daily      dorzolamide (TRUSOPT) 2 % ophthalmic solution 2 drops 3 times daily      latanoprost (XALATAN) 0.005 % ophthalmic solution 1 drop nightly       No current facility-administered medications for this visit.        Allergies   Allergen Reactions    Brimonidine Dermatitis     Inflammation around eye lids       Social History     Tobacco Use    Smoking status: Never Smoker    Smokeless tobacco: Never Used   Substance Use Topics    Alcohol use: Yes     Comment: social        Family History   Problem Relation Age of Onset    Other Mother         Parkinsons Syndrome    Cancer Mother         SCC & BCC    Prostate Cancer Father     Cancer Father         SCC & BCC    Heart Disease Brother     Other Brother         Inflammatroy Bowel disease        Review Of Systems    Skin: no abnormal pigmentation, rash, scaling, itching, ([facility]  Stable polymyalgia rheumatica. He is medically cleared for surgery and anesthesia.     Copy to Dr. Monserrat Renae

## 2022-03-11 ENCOUNTER — PROCEDURE VISIT (OUTPATIENT)
Dept: DERMATOLOGY | Age: 67
End: 2022-03-11
Payer: MEDICARE

## 2022-03-11 VITALS — TEMPERATURE: 97.8 F

## 2022-03-11 DIAGNOSIS — C44.91 SUPERFICIAL BASAL CELL CARCINOMA (BCC): Primary | ICD-10-CM

## 2022-03-11 PROCEDURE — 17262 DSTRJ MAL LES T/A/L 1.1-2.0: CPT | Performed by: DERMATOLOGY

## 2022-03-11 NOTE — PROGRESS NOTES
Electrodesiccation Note    Patient's Name: Jason Perez  MRN: [id number]  YOB: 1955  Date of Visit: 3/11/2022  Referring Provider: No ref. provider found    Subjective:   Jason Perez comes to the office in good condition, for treatment of skin cancer. The provider discussed the risks, benefits, and alternatives to [facility] including pain, bleeding, infection, scar, and damage to either sensory or motor nerves. These are inherent with any surgery, and everything possible will be done to minimize these risks with this procedure. The benefits include removal of the skin cancer with the fewest possible risks and the most cost effective treatment for this particular cancer and area. PRE PROCEDURE TIME OUT:  Site confirmed using dermpath report, photographs and residual biopsy scar  with patient by:            Bruna Mott MD, MS    PATHOLOGY REPORT:    LEFT DORSAL ULNAR FOREARM-     Basal Cell Carcinoma, Superficial Type       RESULTS SIGNATURE   Joanna Hernández MD       Procedure:    Electrodesiccation and Currettage  Location:   Lt dorsal ulnar forearm  Indication:    Superficial BCC  Pretreatment Size:  8 mm  Post Curettage Size:  14 mm      Verbal and written informed consent was obtained. The area was cleaned with alcohol and infiltrated with 1% lidocaine with epinephrine. After adequate analgesia had been confirmed, the area was treated with curettage followed by electrodesiccation. There were 3 passes. Hemostasis was achieved with aluminum chloride. Antibiotic ointment and a sterile dressing were applied and written wound care instructions were given to the patient verbally and in writing. The patient tolerated the procedure well.       Tejas Vital MD, MS

## 2022-04-15 ENCOUNTER — PROCEDURE VISIT (OUTPATIENT)
Dept: DERMATOLOGY | Age: 67
End: 2022-04-15
Payer: MEDICARE

## 2022-04-15 VITALS — DIASTOLIC BLOOD PRESSURE: 79 MMHG | TEMPERATURE: 98 F | SYSTOLIC BLOOD PRESSURE: 121 MMHG | HEART RATE: 57 BPM

## 2022-04-15 DIAGNOSIS — D48.5 NEOPLASM OF UNCERTAIN BEHAVIOR OF SKIN: Primary | ICD-10-CM

## 2022-04-15 DIAGNOSIS — D17.1 LIPOMA OF TORSO: ICD-10-CM

## 2022-04-15 PROCEDURE — 11402 EXC TR-EXT B9+MARG 1.1-2 CM: CPT | Performed by: DERMATOLOGY

## 2022-04-15 PROCEDURE — 12032 INTMD RPR S/A/T/EXT 2.6-7.5: CPT | Performed by: DERMATOLOGY

## 2022-04-15 NOTE — PROGRESS NOTES
Patient's Name: Sylvia Hwang  MRN: [id number]  YOB: 1955  Date of Visit: 4/15/2022  Referring Provider: No ref. provider found      Sylvia Hwang  is a 77 y.o. male who presents in clinic today for Removal of a Neoplasm uncertain behavior, skin which is located on the Lt lateral thigh    Patient is on a blood thinner, No.    Patient has a pacemaker, No    Vitals:    04/15/22 1222   BP: 121/79   Pulse: 57   Temp: 98 °F (36.7 °C)       PRE PROCEDURE TIME OUT:  Site confirmed with patient by:         Iveth Mtz MD, MS     Assistant: Mukul Galaviz    Procedure:   Excision  Reason for Excision:  causing pain  Location:  Lt lateral thigh  Indication:  Neoplasm uncertain behavior, skin  Size of Lesion: 12 mm  Size of Defect (lesion plus margins): 18 mm  Size of Closure: 35mm    Risks including bleeding, scarring, infection and recurrence discussed. Verbal and written informed consent was obtained. The area was anesthetized using 1% lidocaine with epinephrine buffered with 8.4% sodium bicarbonate. The area was prepped and draped in the usual sterile manner and an incision was made to the level of deep subcutaneous fat. The specimen was removed and placed in appropriate specimen container. Hemostasis was achieved with electrocautery. The defect was closed with a layered intermediate repair using 3.0 vicryl suture in the subcutis and dermis, and 4.0 monocryl to close superficial layer in a subcuticular fashion. Steri strips were placed and wound covered with pressure dressing.       Iveth Mtz MD, MS

## 2022-04-15 NOTE — PATIENT INSTRUCTIONS
Excision Wound Steri-strip Bandage Instructions              Keep the wound completely dry for the next 48 hours. After 48 hours, cover the area with plastic when showering if possible and  never soak in water (no swimming or bathing until the stitches are removed/or 14 days if steri-strips in)    Remove the white bandage from the wound 48 hours after surgery. This bandage consists of telfa (white non-adhesive pad) and white tape. Please start acetic acid soaks, daily until sutures are removed or 14 days if absorbable sutures (sutures that dissolve on their own) placed. You may apply ointment (like vaseline or aquaphor) over the steri-strips on the excision line and then cover, after soaks. Underneath the bandage are steri-strips (narrow white strips) that should remain on the wound until stitches are removed or after 14 days if absorbable sutures (sutures that dissolve on their own)    Take two Extra Strength Tylenol (500mg) or age recommended dose 4-6 hours following surgery for pain and repeat as necessary. Call if pain becomes worse. Apply an ice pack to the wound (not directly, but wrapped in a towel) for 10-15 minutes an hour for the next 3-4 hours or longer if directed. Watch the wound for signs of infection which includes: redness spreading from the wound, pus like drainage, pain in the area or if the area becomes warm to the touch. If these occur, please call the clinic. If the area is actively bleeding, apply pressure  for 20 minutes to the wound with no peaking and please call the clinic if it doesn't stop.

## 2022-04-20 LAB — DERMATOLOGY PATHOLOGY REPORT: NORMAL

## 2022-06-30 ENCOUNTER — TELEPHONE (OUTPATIENT)
Dept: INTERNAL MEDICINE CLINIC | Age: 67
End: 2022-06-30

## 2022-06-30 NOTE — TELEPHONE ENCOUNTER
----- Message from Rosajayshree Carlosica sent at 6/30/2022 10:35 AM EDT -----  Subject: Message to Provider    QUESTIONS  Information for Provider? Patient is calling to see if Dr. Rosita Chinchilla or any   other provider at that practice accepts [employer] H[employer] plan. He is   already a patient and is trying to get his son established but needs to   know if this insurance is accepted. Please call asap.  ---------------------------------------------------------------------------  --------------  CALL BACK INFO  What is the best way for the office to contact you? OK to leave message on   voicemail  Preferred Call Back Phone Number?  [phone]  ---------------------------------------------------------------------------  --------------  SCRIPT ANSWERS  undefined

## 2022-07-08 NOTE — TELEPHONE ENCOUNTER
Call returned to Cammie Umana, left message for the patient to call his insurance company to see if he is able to see a physician in our practice. Tax id number given to assist patient in verifying coverage.

## 2023-03-28 ENCOUNTER — TELEPHONE (OUTPATIENT)
Dept: INTERNAL MEDICINE CLINIC | Age: 68
End: 2023-03-28

## 2023-03-28 NOTE — TELEPHONE ENCOUNTER
----- Message from Jocelyne Escalera LPN sent at 2/27/9759 10:36 AM EDT -----  Subject: Appointment Request    Reason for Call: Established Patient Appointment needed: Routine Medicare   AWV    QUESTIONS    Reason for appointment request? No appointments available during search     Additional Information for Provider?  Patient calling to schedule Annual   physical or AWV i could not find anything available through June he will   be out of town April 10-16th he would prefer a morning   ---------------------------------------------------------------------------  --------------  Hany Counter INFO  [phone]; OK to leave message on voicemail  ---------------------------------------------------------------------------  --------------  SCRIPT ANSWERS  COVID Screen: Adeola Mendoza

## 2023-04-06 ENCOUNTER — OFFICE VISIT (OUTPATIENT)
Dept: INTERNAL MEDICINE CLINIC | Age: 68
End: 2023-04-06

## 2023-04-06 VITALS
DIASTOLIC BLOOD PRESSURE: 70 MMHG | BODY MASS INDEX: 19.18 KG/M2 | SYSTOLIC BLOOD PRESSURE: 134 MMHG | WEIGHT: 134 LBS | HEIGHT: 70 IN

## 2023-04-06 DIAGNOSIS — Z12.5 SCREENING PSA (PROSTATE SPECIFIC ANTIGEN): ICD-10-CM

## 2023-04-06 DIAGNOSIS — Z23 NEED FOR PNEUMOCOCCAL VACCINE: ICD-10-CM

## 2023-04-06 DIAGNOSIS — M35.3 POLYMYALGIA RHEUMATICA (HCC): ICD-10-CM

## 2023-04-06 DIAGNOSIS — H40.1190 PRIMARY OPEN ANGLE GLAUCOMA, UNSPECIFIED GLAUCOMA STAGE, UNSPECIFIED LATERALITY: ICD-10-CM

## 2023-04-06 DIAGNOSIS — Z00.00 PE (PHYSICAL EXAM), ANNUAL: ICD-10-CM

## 2023-04-06 DIAGNOSIS — Z00.00 PE (PHYSICAL EXAM), ANNUAL: Primary | ICD-10-CM

## 2023-04-06 DIAGNOSIS — E78.5 HYPERLIPIDEMIA, UNSPECIFIED HYPERLIPIDEMIA TYPE: ICD-10-CM

## 2023-04-06 LAB
ALBUMIN SERPL-MCNC: 4.5 G/DL (ref 3.4–5)
ALBUMIN/GLOB SERPL: 2.1 {RATIO} (ref 1.1–2.2)
ALP SERPL-CCNC: 63 U/L (ref 40–129)
ALT SERPL-CCNC: 17 U/L (ref 10–40)
ANION GAP SERPL CALCULATED.3IONS-SCNC: 11 MMOL/L (ref 3–16)
AST SERPL-CCNC: 20 U/L (ref 15–37)
BASOPHILS # BLD: 0 K/UL (ref 0–0.2)
BASOPHILS NFR BLD: 1.1 %
BILIRUB SERPL-MCNC: 0.6 MG/DL (ref 0–1)
BILIRUB UR QL STRIP.AUTO: NEGATIVE
BUN SERPL-MCNC: 18 MG/DL (ref 7–20)
CALCIUM SERPL-MCNC: 9.4 MG/DL (ref 8.3–10.6)
CHLORIDE SERPL-SCNC: 104 MMOL/L (ref 99–110)
CHOLEST SERPL-MCNC: 182 MG/DL (ref 0–199)
CLARITY UR: CLEAR
CO2 SERPL-SCNC: 24 MMOL/L (ref 21–32)
COLOR UR: ABNORMAL
CREAT SERPL-MCNC: 0.9 MG/DL (ref 0.8–1.3)
CRP SERPL-MCNC: <3 MG/L (ref 0–5.1)
DEPRECATED RDW RBC AUTO: 13.7 % (ref 12.4–15.4)
EOSINOPHIL # BLD: 0 K/UL (ref 0–0.6)
EOSINOPHIL NFR BLD: 1.1 %
ERYTHROCYTE [SEDIMENTATION RATE] IN BLOOD BY WESTERGREN METHOD: <1 MM/HR (ref 0–20)
GFR SERPLBLD CREATININE-BSD FMLA CKD-EPI: >60 ML/MIN/{1.73_M2}
GLUCOSE SERPL-MCNC: 102 MG/DL (ref 70–99)
GLUCOSE UR STRIP.AUTO-MCNC: NEGATIVE MG/DL
HCT VFR BLD AUTO: 45.6 % (ref 40.5–52.5)
HCV AB SERPL QL IA: NORMAL
HDLC SERPL-MCNC: 52 MG/DL (ref 40–60)
HGB BLD-MCNC: 15.8 G/DL (ref 13.5–17.5)
HGB UR QL STRIP.AUTO: NEGATIVE
KETONES UR STRIP.AUTO-MCNC: NEGATIVE MG/DL
LDLC SERPL CALC-MCNC: 118 MG/DL
LEUKOCYTE ESTERASE UR QL STRIP.AUTO: NEGATIVE
LYMPHOCYTES # BLD: 1.2 K/UL (ref 1–5.1)
LYMPHOCYTES NFR BLD: 29 %
MCH RBC QN AUTO: 32.5 PG (ref 26–34)
MCHC RBC AUTO-ENTMCNC: 34.7 G/DL (ref 31–36)
MCV RBC AUTO: 93.7 FL (ref 80–100)
MONOCYTES # BLD: 0.3 K/UL (ref 0–1.3)
MONOCYTES NFR BLD: 7.4 %
NEUTROPHILS # BLD: 2.6 K/UL (ref 1.7–7.7)
NEUTROPHILS NFR BLD: 61.4 %
NITRITE UR QL STRIP.AUTO: NEGATIVE
PH UR STRIP.AUTO: 5.5 [PH] (ref 5–8)
PLATELET # BLD AUTO: 216 K/UL (ref 135–450)
PMV BLD AUTO: 9.2 FL (ref 5–10.5)
POTASSIUM SERPL-SCNC: 4.6 MMOL/L (ref 3.5–5.1)
PROT SERPL-MCNC: 6.6 G/DL (ref 6.4–8.2)
PROT UR STRIP.AUTO-MCNC: NEGATIVE MG/DL
PSA SERPL DL<=0.01 NG/ML-MCNC: 1.62 NG/ML (ref 0–4)
RBC # BLD AUTO: 4.86 M/UL (ref 4.2–5.9)
SODIUM SERPL-SCNC: 139 MMOL/L (ref 136–145)
SP GR UR STRIP.AUTO: 1.02 (ref 1–1.03)
TRIGL SERPL-MCNC: 62 MG/DL (ref 0–150)
TSH SERPL DL<=0.005 MIU/L-ACNC: 2.54 UIU/ML (ref 0.27–4.2)
UA DIPSTICK W REFLEX MICRO PNL UR: ABNORMAL
URN SPEC COLLECT METH UR: ABNORMAL
UROBILINOGEN UR STRIP-ACNC: 0.2 E.U./DL
VLDLC SERPL CALC-MCNC: 12 MG/DL
WBC # BLD AUTO: 4.2 K/UL (ref 4–11)

## 2023-04-06 SDOH — ECONOMIC STABILITY: INCOME INSECURITY: HOW HARD IS IT FOR YOU TO PAY FOR THE VERY BASICS LIKE FOOD, HOUSING, MEDICAL CARE, AND HEATING?: NOT HARD AT ALL

## 2023-04-06 SDOH — ECONOMIC STABILITY: FOOD INSECURITY: WITHIN THE PAST 12 MONTHS, YOU WORRIED THAT YOUR FOOD WOULD RUN OUT BEFORE YOU GOT MONEY TO BUY MORE.: NEVER TRUE

## 2023-04-06 SDOH — ECONOMIC STABILITY: HOUSING INSECURITY
IN THE LAST 12 MONTHS, WAS THERE A TIME WHEN YOU DID NOT HAVE A STEADY PLACE TO SLEEP OR SLEPT IN A SHELTER (INCLUDING NOW)?: NO

## 2023-04-06 SDOH — ECONOMIC STABILITY: FOOD INSECURITY: WITHIN THE PAST 12 MONTHS, THE FOOD YOU BOUGHT JUST DIDN'T LAST AND YOU DIDN'T HAVE MONEY TO GET MORE.: NEVER TRUE

## 2023-04-06 ASSESSMENT — PATIENT HEALTH QUESTIONNAIRE - PHQ9
SUM OF ALL RESPONSES TO PHQ QUESTIONS 1-9: 0
SUM OF ALL RESPONSES TO PHQ QUESTIONS 1-9: 0
1. LITTLE INTEREST OR PLEASURE IN DOING THINGS: 0
2. FEELING DOWN, DEPRESSED OR HOPELESS: 0
SUM OF ALL RESPONSES TO PHQ9 QUESTIONS 1 & 2: 0
SUM OF ALL RESPONSES TO PHQ QUESTIONS 1-9: 0
SUM OF ALL RESPONSES TO PHQ QUESTIONS 1-9: 0

## 2023-04-06 ASSESSMENT — LIFESTYLE VARIABLES
HOW MANY STANDARD DRINKS CONTAINING ALCOHOL DO YOU HAVE ON A TYPICAL DAY: 1 OR 2
HOW OFTEN DO YOU HAVE A DRINK CONTAINING ALCOHOL: MONTHLY OR LESS

## 2023-04-06 NOTE — PROGRESS NOTES
Antibody; Future    4. Hyperlipidemia, unspecified hyperlipidemia type  Problem is stable check above labs  - CBC with Auto Differential; Future  - Comprehensive Metabolic Panel; Future  - Lipid Panel; Future  - PSA Screening; Future  - TSH; Future  - Urinalysis; Future  - C-Reactive Protein; Future  - Sedimentation Rate; Future  - Hepatitis C Antibody; Future    5. Screening PSA (prostate specific antigen)  Check above labs  - PSA Screening;  Future

## 2023-04-21 DIAGNOSIS — M35.3 POLYMYALGIA RHEUMATICA (HCC): Primary | ICD-10-CM

## 2023-05-09 ENCOUNTER — TELEPHONE (OUTPATIENT)
Dept: INTERNAL MEDICINE CLINIC | Age: 68
End: 2023-05-09
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
## 2024-06-20 ENCOUNTER — TELEPHONE (OUTPATIENT)
Dept: PRIMARY CARE CLINIC | Age: 69
End: 2024-06-20

## 2024-06-20 NOTE — TELEPHONE ENCOUNTER
PC to patient to follow up after receiving message from call center.  Patient is not moving to the area, message was just sent to the wrong office.  Patient will attempt to reach provider through [facility] as he was having difficulty understanding the call center    Patient also reminded that he can schedule direct through [facility]      Original Message:    From: Geovanny Hernández   Sent: 6/19/2024   5:09 PM EDT   To: [facility] Clinical Staff   Subject: [facility] Appointment Request                          [facility] Appointment Request     Patient needs appointment for [facility] Appointment Type: Annual Visit.     Reason for Appointment Request: No appointments available during search     Patient wants to schedule an appointment for Annual Visit and Pre-op. The patient's preferred time will be on July 2 or 3.   --------------------------------------------------------------------------------------------------------------------------     Relationship to Patient: Self     Call Back Information: OK to leave message on voicemail   Preferred Call Back Number: Phone [phone]

## 2024-08-09 ENCOUNTER — OFFICE VISIT (OUTPATIENT)
Dept: INTERNAL MEDICINE CLINIC | Age: 69
End: 2024-08-09
Payer: MEDICARE

## 2024-08-09 VITALS
HEIGHT: 69 IN | OXYGEN SATURATION: 98 % | BODY MASS INDEX: 19.85 KG/M2 | SYSTOLIC BLOOD PRESSURE: 120 MMHG | TEMPERATURE: 99.1 F | DIASTOLIC BLOOD PRESSURE: 76 MMHG | HEART RATE: 74 BPM | WEIGHT: 134 LBS

## 2024-08-09 DIAGNOSIS — Z12.5 SCREENING PSA (PROSTATE SPECIFIC ANTIGEN): ICD-10-CM

## 2024-08-09 DIAGNOSIS — M35.3 POLYMYALGIA RHEUMATICA (HCC): ICD-10-CM

## 2024-08-09 DIAGNOSIS — E55.9 VITAMIN D DEFICIENCY: ICD-10-CM

## 2024-08-09 DIAGNOSIS — Z00.00 PE (PHYSICAL EXAM), ANNUAL: Primary | ICD-10-CM

## 2024-08-09 DIAGNOSIS — H40.1190 PRIMARY OPEN ANGLE GLAUCOMA, UNSPECIFIED GLAUCOMA STAGE, UNSPECIFIED LATERALITY: ICD-10-CM

## 2024-08-09 PROCEDURE — G0439 PPPS, SUBSEQ VISIT: HCPCS | Performed by: INTERNAL MEDICINE

## 2024-08-09 PROCEDURE — 1123F ACP DISCUSS/DSCN MKR DOCD: CPT | Performed by: INTERNAL MEDICINE

## 2024-08-09 PROCEDURE — 3017F COLORECTAL CA SCREEN DOC REV: CPT | Performed by: INTERNAL MEDICINE

## 2024-08-09 ASSESSMENT — PATIENT HEALTH QUESTIONNAIRE - PHQ9
1. LITTLE INTEREST OR PLEASURE IN DOING THINGS: NOT AT ALL
SUM OF ALL RESPONSES TO PHQ QUESTIONS 1-9: 0
SUM OF ALL RESPONSES TO PHQ9 QUESTIONS 1 & 2: 0
2. FEELING DOWN, DEPRESSED OR HOPELESS: NOT AT ALL

## 2024-08-09 NOTE — PROGRESS NOTES
Annual Wellness Visit     Patient:  Salvatore Rodas                                               : 1955  Age: 68 y.o.  MRN: [id number]  Date : 2024      CHIEF COMPLAINT: Salvatore Rodas is a 68 y.o. male who presents for : Physical exam    1. Primary open angle glaucoma, unspecified glaucoma stage, unspecified laterality  This problem is stable followed by ophthalmology    2. Polymyalgia rheumatica (HCC)  Vague muscle pains recently had his total knee replaced on the left  - C-Reactive Protein; Future  - Sedimentation Rate; Future    3. PE (physical exam), annual  Generally feels okay denies any chest pain shortness of breath or any other problems he is recovering from knee surgery  - [facility] Thaddeus Dodson MD, Gastroenterology (ERCP & EUS), [facility]  - CBC with Auto Differential; Future  - Comprehensive Metabolic Panel; Future  - Lipid Panel; Future  - PSA Screening; Future  - TSH; Future  - Urinalysis; Future  - Vitamin D 25 Hydroxy; Future    4. Vitamin D deficiency    - Vitamin D 25 Hydroxy; Future    5. Screening PSA (prostate specific antigen)    - PSA Screening; Future        Patient Active Problem List    Diagnosis Date Noted    Primary open angle glaucoma (POAG) 2019    Polymyalgia rheumatica (HCC) 2019       Constitutional:  Denies fever or chills   Eyes:  Denies change in visual acuity   HENT:  Denies nasal congestion or sore throat   Respiratory:  Denies cough or shortness of breath   Cardiovascular:  Denies chest pain or edema   GI:  Denies abdominal pain, nausea, vomiting, bloody stools or diarrhea   :  Denies dysuria   Musculoskeletal:  Denies back pain or joint pain   Integument:  Denies rash   Neurologic:  Denies headache, focal weakness or sensory changes   Endocrine:  Denies polyuria or polydipsia   Lymphatic:  Denies swollen glands   Psychiatric:  Denies depression or anxiety     Past Medical History:        Diagnosis Date    PMR (polymyalgia rheumatica) (HCC)

## 2024-08-12 DIAGNOSIS — Z12.5 SCREENING PSA (PROSTATE SPECIFIC ANTIGEN): ICD-10-CM

## 2024-08-12 DIAGNOSIS — E55.9 VITAMIN D DEFICIENCY: ICD-10-CM

## 2024-08-12 DIAGNOSIS — M35.3 POLYMYALGIA RHEUMATICA (HCC): ICD-10-CM

## 2024-08-12 DIAGNOSIS — Z00.00 PE (PHYSICAL EXAM), ANNUAL: ICD-10-CM

## 2024-08-12 LAB
25(OH)D3 SERPL-MCNC: 37.9 NG/ML
ALBUMIN SERPL-MCNC: 4.7 G/DL (ref 3.4–5)
ALBUMIN/GLOB SERPL: 2.6 {RATIO} (ref 1.1–2.2)
ALP SERPL-CCNC: 74 U/L (ref 40–129)
ALT SERPL-CCNC: 12 U/L (ref 10–40)
ANION GAP SERPL CALCULATED.3IONS-SCNC: 13 MMOL/L (ref 3–16)
AST SERPL-CCNC: 16 U/L (ref 15–37)
BASOPHILS # BLD: 0 K/UL (ref 0–0.2)
BASOPHILS NFR BLD: 1 %
BILIRUB SERPL-MCNC: 0.5 MG/DL (ref 0–1)
BILIRUB UR QL STRIP.AUTO: NEGATIVE
BUN SERPL-MCNC: 12 MG/DL (ref 7–20)
CALCIUM SERPL-MCNC: 9.5 MG/DL (ref 8.3–10.6)
CHLORIDE SERPL-SCNC: 108 MMOL/L (ref 99–110)
CHOLEST SERPL-MCNC: 165 MG/DL (ref 0–199)
CLARITY UR: CLEAR
CO2 SERPL-SCNC: 25 MMOL/L (ref 21–32)
COLOR UR: YELLOW
CREAT SERPL-MCNC: 0.8 MG/DL (ref 0.8–1.3)
CRP SERPL-MCNC: <3 MG/L (ref 0–5.1)
DEPRECATED RDW RBC AUTO: 14.5 % (ref 12.4–15.4)
EOSINOPHIL # BLD: 0.1 K/UL (ref 0–0.6)
EOSINOPHIL NFR BLD: 2.8 %
ERYTHROCYTE [SEDIMENTATION RATE] IN BLOOD BY WESTERGREN METHOD: <1 MM/HR (ref 0–20)
GFR SERPLBLD CREATININE-BSD FMLA CKD-EPI: >90 ML/MIN/{1.73_M2}
GLUCOSE SERPL-MCNC: 98 MG/DL (ref 70–99)
GLUCOSE UR STRIP.AUTO-MCNC: NEGATIVE MG/DL
HCT VFR BLD AUTO: 46.1 % (ref 40.5–52.5)
HDLC SERPL-MCNC: 43 MG/DL (ref 40–60)
HGB BLD-MCNC: 15.5 G/DL (ref 13.5–17.5)
HGB UR QL STRIP.AUTO: NEGATIVE
KETONES UR STRIP.AUTO-MCNC: NEGATIVE MG/DL
LDLC SERPL CALC-MCNC: 102 MG/DL
LEUKOCYTE ESTERASE UR QL STRIP.AUTO: NEGATIVE
LYMPHOCYTES # BLD: 1.7 K/UL (ref 1–5.1)
LYMPHOCYTES NFR BLD: 36.1 %
MCH RBC QN AUTO: 31.6 PG (ref 26–34)
MCHC RBC AUTO-ENTMCNC: 33.6 G/DL (ref 31–36)
MCV RBC AUTO: 94.1 FL (ref 80–100)
MONOCYTES # BLD: 0.4 K/UL (ref 0–1.3)
MONOCYTES NFR BLD: 7.5 %
NEUTROPHILS # BLD: 2.5 K/UL (ref 1.7–7.7)
NEUTROPHILS NFR BLD: 52.6 %
NITRITE UR QL STRIP.AUTO: NEGATIVE
PH UR STRIP.AUTO: 6 [PH] (ref 5–8)
PLATELET # BLD AUTO: 243 K/UL (ref 135–450)
PMV BLD AUTO: 9.8 FL (ref 5–10.5)
POTASSIUM SERPL-SCNC: 4.5 MMOL/L (ref 3.5–5.1)
PROT SERPL-MCNC: 6.5 G/DL (ref 6.4–8.2)
PROT UR STRIP.AUTO-MCNC: NEGATIVE MG/DL
PSA SERPL DL<=0.01 NG/ML-MCNC: 2.18 NG/ML (ref 0–4)
RBC # BLD AUTO: 4.9 M/UL (ref 4.2–5.9)
SODIUM SERPL-SCNC: 146 MMOL/L (ref 136–145)
SP GR UR STRIP.AUTO: 1.01 (ref 1–1.03)
TRIGL SERPL-MCNC: 99 MG/DL (ref 0–150)
TSH SERPL DL<=0.005 MIU/L-ACNC: 1.74 UIU/ML (ref 0.27–4.2)
UA DIPSTICK W REFLEX MICRO PNL UR: NORMAL
URN SPEC COLLECT METH UR: NORMAL
UROBILINOGEN UR STRIP-ACNC: 0.2 E.U./DL
VLDLC SERPL CALC-MCNC: 20 MG/DL
WBC # BLD AUTO: 4.7 K/UL (ref 4–11)

## (undated) DEVICE — Device: Brand: DISPOSABLE ELECTROSURGICAL SNARE

## (undated) DEVICE — SET VLV 3 PC AWS DISPOSABLE GRDIAN SCOPEVALET

## (undated) DEVICE — TRAP SPEC RETRV CLR PLAS POLYP IN LN SUCT QUIK CTCH

## (undated) DEVICE — SOLUTION IV IRRIG WATER 500ML POUR BRL ST 2F7113

## (undated) DEVICE — BW-412T DISP COMBO CLEANING BRUSH: Brand: SINGLE USE COMBINATION CLEANING BRUSH

## (undated) DEVICE — PROCEDURE KIT ENDOSCP CUST